# Patient Record
Sex: FEMALE | Race: WHITE | Employment: FULL TIME | URBAN - METROPOLITAN AREA
[De-identification: names, ages, dates, MRNs, and addresses within clinical notes are randomized per-mention and may not be internally consistent; named-entity substitution may affect disease eponyms.]

---

## 2018-06-01 ENCOUNTER — APPOINTMENT (OUTPATIENT)
Dept: GENERAL RADIOLOGY | Facility: CLINIC | Age: 33
End: 2018-06-01
Attending: EMERGENCY MEDICINE
Payer: COMMERCIAL

## 2018-06-01 ENCOUNTER — HOSPITAL ENCOUNTER (EMERGENCY)
Facility: CLINIC | Age: 33
Discharge: ANOTHER HOSPITAL | End: 2018-06-01
Attending: EMERGENCY MEDICINE | Admitting: EMERGENCY MEDICINE
Payer: COMMERCIAL

## 2018-06-01 VITALS
SYSTOLIC BLOOD PRESSURE: 113 MMHG | BODY MASS INDEX: 24.53 KG/M2 | HEART RATE: 99 BPM | WEIGHT: 152 LBS | DIASTOLIC BLOOD PRESSURE: 73 MMHG | TEMPERATURE: 98.1 F | RESPIRATION RATE: 16 BRPM | OXYGEN SATURATION: 100 %

## 2018-06-01 DIAGNOSIS — S61.217A LACERATION OF LEFT LITTLE FINGER WITHOUT FOREIGN BODY WITHOUT DAMAGE TO NAIL, INITIAL ENCOUNTER: ICD-10-CM

## 2018-06-01 DIAGNOSIS — S62.617B OPEN DISPLACED FRACTURE OF PROXIMAL PHALANX OF LEFT LITTLE FINGER, INITIAL ENCOUNTER: ICD-10-CM

## 2018-06-01 PROCEDURE — 25000128 H RX IP 250 OP 636: Performed by: EMERGENCY MEDICINE

## 2018-06-01 PROCEDURE — 96375 TX/PRO/DX INJ NEW DRUG ADDON: CPT | Performed by: EMERGENCY MEDICINE

## 2018-06-01 PROCEDURE — 64450 NJX AA&/STRD OTHER PN/BRANCH: CPT

## 2018-06-01 PROCEDURE — 73140 X-RAY EXAM OF FINGER(S): CPT | Mod: LT

## 2018-06-01 PROCEDURE — 96374 THER/PROPH/DIAG INJ IV PUSH: CPT | Performed by: EMERGENCY MEDICINE

## 2018-06-01 PROCEDURE — 96375 TX/PRO/DX INJ NEW DRUG ADDON: CPT

## 2018-06-01 PROCEDURE — 99285 EMERGENCY DEPT VISIT HI MDM: CPT | Performed by: EMERGENCY MEDICINE

## 2018-06-01 PROCEDURE — 96374 THER/PROPH/DIAG INJ IV PUSH: CPT

## 2018-06-01 PROCEDURE — 99285 EMERGENCY DEPT VISIT HI MDM: CPT | Mod: 25

## 2018-06-01 PROCEDURE — 99285 EMERGENCY DEPT VISIT HI MDM: CPT | Mod: Z6 | Performed by: EMERGENCY MEDICINE

## 2018-06-01 PROCEDURE — 25000132 ZZH RX MED GY IP 250 OP 250 PS 637: Performed by: EMERGENCY MEDICINE

## 2018-06-01 RX ORDER — MORPHINE SULFATE 4 MG/ML
INJECTION, SOLUTION INTRAMUSCULAR; INTRAVENOUS
Status: DISCONTINUED
Start: 2018-06-01 | End: 2018-06-01 | Stop reason: HOSPADM

## 2018-06-01 RX ORDER — LIDOCAINE HYDROCHLORIDE 10 MG/ML
5 INJECTION, SOLUTION EPIDURAL; INFILTRATION; INTRACAUDAL; PERINEURAL ONCE
Status: DISCONTINUED | OUTPATIENT
Start: 2018-06-01 | End: 2018-06-01 | Stop reason: HOSPADM

## 2018-06-01 RX ORDER — HYDROCODONE BITARTRATE AND ACETAMINOPHEN 5; 325 MG/1; MG/1
1 TABLET ORAL ONCE
Status: COMPLETED | OUTPATIENT
Start: 2018-06-01 | End: 2018-06-01

## 2018-06-01 RX ORDER — MORPHINE SULFATE 4 MG/ML
4 INJECTION, SOLUTION INTRAMUSCULAR; INTRAVENOUS ONCE
Status: COMPLETED | OUTPATIENT
Start: 2018-06-01 | End: 2018-06-01

## 2018-06-01 RX ADMIN — HYDROCODONE BITARTRATE AND ACETAMINOPHEN 1 TABLET: 5; 325 TABLET ORAL at 20:01

## 2018-06-01 RX ADMIN — MORPHINE SULFATE 4 MG: 4 INJECTION INTRAVENOUS at 22:34

## 2018-06-01 RX ADMIN — CEFAZOLIN SODIUM 1 G: 1 INJECTION, SOLUTION INTRAVENOUS at 21:43

## 2018-06-01 ASSESSMENT — ENCOUNTER SYMPTOMS
NUMBNESS: 1
WOUND: 1

## 2018-06-02 ENCOUNTER — HOSPITAL ENCOUNTER (EMERGENCY)
Facility: CLINIC | Age: 33
Discharge: HOME | End: 2018-06-02
Attending: EMERGENCY MEDICINE | Admitting: EMERGENCY MEDICINE
Payer: COMMERCIAL

## 2018-06-02 VITALS
HEIGHT: 66 IN | RESPIRATION RATE: 16 BRPM | DIASTOLIC BLOOD PRESSURE: 66 MMHG | TEMPERATURE: 98.4 F | OXYGEN SATURATION: 97 % | SYSTOLIC BLOOD PRESSURE: 103 MMHG | BODY MASS INDEX: 24.53 KG/M2 | HEART RATE: 59 BPM

## 2018-06-02 DIAGNOSIS — S62.647B OPEN NONDISPLACED FRACTURE OF PROXIMAL PHALANX OF LEFT LITTLE FINGER, INITIAL ENCOUNTER: ICD-10-CM

## 2018-06-02 PROCEDURE — 96374 THER/PROPH/DIAG INJ IV PUSH: CPT | Performed by: EMERGENCY MEDICINE

## 2018-06-02 PROCEDURE — 96375 TX/PRO/DX INJ NEW DRUG ADDON: CPT | Performed by: EMERGENCY MEDICINE

## 2018-06-02 PROCEDURE — 25000128 H RX IP 250 OP 636: Performed by: EMERGENCY MEDICINE

## 2018-06-02 RX ORDER — CEPHALEXIN 500 MG/1
500 CAPSULE ORAL 3 TIMES DAILY
Qty: 21 CAPSULE | Refills: 0 | Status: SHIPPED | OUTPATIENT
Start: 2018-06-02 | End: 2018-06-09

## 2018-06-02 RX ORDER — OXYCODONE HYDROCHLORIDE 5 MG/1
5 TABLET ORAL EVERY 6 HOURS PRN
Qty: 12 TABLET | Refills: 0 | Status: SHIPPED | OUTPATIENT
Start: 2018-06-02 | End: 2018-06-06

## 2018-06-02 RX ORDER — KETOROLAC TROMETHAMINE 30 MG/ML
30 INJECTION, SOLUTION INTRAMUSCULAR; INTRAVENOUS ONCE
Status: COMPLETED | OUTPATIENT
Start: 2018-06-02 | End: 2018-06-02

## 2018-06-02 RX ORDER — MAGNESIUM HYDROXIDE 1200 MG/15ML
LIQUID ORAL
Status: DISCONTINUED
Start: 2018-06-02 | End: 2018-06-02 | Stop reason: HOSPADM

## 2018-06-02 RX ORDER — HYDROMORPHONE HYDROCHLORIDE 1 MG/ML
0.5 INJECTION, SOLUTION INTRAMUSCULAR; INTRAVENOUS; SUBCUTANEOUS
Status: DISCONTINUED | OUTPATIENT
Start: 2018-06-02 | End: 2018-06-02 | Stop reason: HOSPADM

## 2018-06-02 RX ADMIN — KETOROLAC TROMETHAMINE 30 MG: 30 INJECTION, SOLUTION INTRAMUSCULAR at 01:21

## 2018-06-02 RX ADMIN — HYDROMORPHONE HYDROCHLORIDE 0.5 MG: 1 INJECTION, SOLUTION INTRAMUSCULAR; INTRAVENOUS; SUBCUTANEOUS at 00:50

## 2018-06-02 ASSESSMENT — ENCOUNTER SYMPTOMS
WOUND: 1
NUMBNESS: 1

## 2018-06-02 NOTE — ED TRIAGE NOTES
32-year-old female presents to the ER with complaints of a crush injury to her left hand. States she got it crushed between asphalt and a tailgate of a truck.

## 2018-06-02 NOTE — ED NOTES
Transfer from [facility]. Lac and fracture to left fifth finger. Finger wrapped and bleeding controlled on arrival to ED.

## 2018-06-02 NOTE — ED PROVIDER NOTES
History     Chief Complaint:  Hand Injury    HPI   Jennifer Roman is a 32 year old female who presents with a hand injury. After reviewing the patient's medical history, it was determined that last tetanus was given in 2014. The patient states that she was outside moving chunks of asphalt into the bed of her truck when she crushed her left hand between the hitch of her car and a chunk she was moving. The patient states that she was wearing gardening gloves during the incident, and when she took them off she was worried that the bone may be sticking out. She states that currently, her finger feels numb and this is worsening. There is no other pain in any of the other fingers. The patient had her last full meal at 6:30 pm about 1 hour before arrival to the ED. The patient is taking vitamins, but is not taking any other form of medication.    Allergies:  No Known Allergies     Medications:    Epinephrine    Past Medical History:    Abnormal Pap smear  Asthma  Rh incompatibility  Hyperemesis    Past Surgical History:    Dental Surgery     Family History:    Paternal grandfather: cancer   Paternal grandmother: cancer  Maternal grandmother: cancer    Social History:  Marital status:   Smoking status: former smoker  Quit date: 1/1/2011  smokeless tobacco: never used  Alcohol use: no   Marital Status:       Review of Systems   Skin: Positive for wound (left pinky).   Neurological: Positive for numbness (numbness of pinky finger ).   All other systems reviewed and are negative.    Physical Exam     Patient Vitals for the past 24 hrs:   BP Temp Temp src Pulse Heart Rate Resp SpO2 Weight   06/01/18 2215 - - - - - - 100 % -   06/01/18 2200 - - - - - - 99 % -   06/01/18 2146 113/73 98.1  F (36.7  C) Oral - 75 16 98 % -   06/01/18 2145 - - - - - - 98 % -   06/01/18 2140 113/73 - - - - - - -   06/01/18 1938 127/80 97.7  F (36.5  C) Temporal 99 - 18 100 % 68.9 kg (152 lb)     Physical Exam  Constitutional:  Alert, attentive, GCS 15, middle aged woman   HENT: normocephalic, atraumatic   Eyes: Normal conjunctiva. Pupils are equal, round, and reactive to light.   CV: regular rate and rhythm  Chest: Effort normal and breath sounds normal.   GI:  There is no tenderness. No distension.   MSK: Normal range of motion.   Neurological: Alert, attentive, oriented x4, sensation intact in left fifth finger except decreased over ulnar lateral aspect, able to fully flex and extend at DIP, ROM at PIP limited by pain and swelling  Skin:  laceration over radial lateral aspect of left 5th finger measuring 2.6 cm between MCP and PIP, soft tissue swelling of proximal finger up to PIP, no bone visualized, fat globule sticking out of wound     Emergency Department Course     Imaging:  Radiology findings were communicated with the patient who voiced understanding of the findings.    Xray of fingers  Fractured proximal phalanx fifth finger.  Lang Ny MD  Reading per radiology    Procedures:    PROCEDURE:  Digital Block  LOCATION:  Left Pinky Finger  ANESTHESIA: Digital block using 1% Lidocaine without Epi, total of 2 mLs  PROCEDURE NOTE: The patient tolerated the procedure well with good relief of discomfort and there were no complications. Wound was irrigated with NS by technician.  Interventions:  2001 Norco 1 tablet Oral  2143 Ancef 1 g IV  2234 Morphine 4 mg IV    Emergency Department Course:    1934 Nursing notes and vitals reviewed.    1942 I performed an exam of the patient as documented above.     2009 The patient was sent for a finger xray while in the emergency department, results above.     2057 I spoke with Dr. Mccormick of the orthopedic service from [facility] regarding patient's presentation, findings, and plan of care.    2103 I spoke with Dr. Gutierrez of the emergency department service from the [facility] regarding patient's presentation, findings, and plan of care.    2110 I spoke with Dr. Mercado of the  plastic surgery hand coverage service from the [facility] regarding patient's presentation, findings, and plan of care.     I spoke with Dr. Gutierrez of the emergency department service from the [facility] regarding patient's presentation, findings, and plan of care.      I personally reviewed the imaging results with the patient and answered all related questions prior to transfer.    Impression & Plan      Medical Decision Makin-year-old right-handed female presenting with crush injury to her left fifth finger.  Differential diagnosis includes closed fracture, open fracture, finger dislocation, laceration, tendon injury, compartment syndrome left finger.  X-ray shows a fracture involving her proximal phalanx with angulation.  She is reporting decreased sensation along the ulnar aspect of that finger.  In addition there is a 2.6 cm laceration with soft tissue swelling and continued venous oozing.  Although I do not see any discrete distinct bone protruding from the laceration, I am very concerned that this is a open fracture given the location of the laceration.  I discussed the patient with the on-call [facility] orthopedic doctor, who recommended that the patient be transferred to [facility] for hand consultation if this is truly an open fracture.  Dr. Mercado the hand surgeon from plastic surgery at [facility] is aware of the patient.  He thinks that it will also need to be reduced.  Patient was accepted for transfer by Dr. Huntley at [facility] to the emergency department.  She will be transferred by private vehicle.  IV was placed and she was given a dose of Ancef.  Her tetanus is up-to-date.  She understands plan for washout and reduction.  All her questions were answered and she was transferred to [facility] ED via private care with her .        Diagnosis:    ICD-10-CM    1. Laceration of left little finger without  foreign body without damage to nail, initial encounter S61.217A    2. Open displaced fracture of proximal phalanx of left little finger, initial encounter S62.617B      Disposition:   Patient is transferred via private vehicle to the [facility] pending further evaluation and treatment.     Scribe Disclosure:  I, Jade Rivero, am serving as a scribe at 8:45 PM on 6/1/2018 to document services personally performed by Muriel Nicole MD based on my observations and the provider's statements to me.    [facility] EMERGENCY DEPARTMENT       Muriel Nicole MD  06/02/18 0204

## 2018-06-02 NOTE — ED PROVIDER NOTES
History     Chief Complaint   Patient presents with     Hand Injury     HPI  Jennifer Roman is an otherwise healthy right-handed 32-year-old female who presents to the Emergency Department from [facility] ED for evaluation of a left hand laceration. Patient reports that she was loading wood onto a truck when she hit her left fifth digit causing a deep laceration. This was at approximately 7:00 PM tonight. Patient states that before she was given pain medication at [facility], it felt like a heavy, burning pain that has now turned into numbness. Per chart review, her last tetanus immunization was in 4/2012. Patient denies tingling sensation in fifth digit. She states that her current pain is a 5-6/10. Per [facility] note, patient was given Norco 1 tablet, Ancef 1 g IV, morphine 4 mg IV.     No current facility-administered medications for this encounter.      Current Outpatient Prescriptions   Medication     cephALEXin (KEFLEX) 500 MG capsule     oxyCODONE IR (ROXICODONE) 5 MG tablet     acetaminophen (TYLENOL) 325 MG tablet     EPINEPHrine 0.3 MG/0.3ML injection 2-pack     ibuprofen (ADVIL,MOTRIN) 400-800 mg tablet     penicillin V potassium (VEETID) 500 MG tablet     PRENATAL VITAMINS PO     Past Medical History:   Diagnosis Date     Abnormal Pap smear     normal now     Asthma     excercise induced     Rh incompatibility     A neg       Past Surgical History:   Procedure Laterality Date     DENTAL SURGERY         Family History   Problem Relation Age of Onset     CANCER Paternal Grandfather      CANCER Paternal Grandmother      CANCER Maternal Grandfather        Social History   Substance Use Topics     Smoking status: Former Smoker     Quit date: 1/1/2011     Smokeless tobacco: Never Used     Alcohol use No     No Known Allergies    I have reviewed the Medications, Allergies, Past Medical and Surgical History, and Social History in the Epic system.    Review of Systems   Skin: Positive for wound  "(laceration to left fifth digit).   Neurological: Positive for numbness (left fifth digit).        Negative for tingling sensation in fifth digit   All other systems reviewed and are negative.      Physical Exam   BP: 111/62  Pulse: 59  Temp: 98.4  F (36.9  C)  Resp: 16  Height: 167.6 cm (5' 6\")  SpO2: 100 %      Physical Exam   Constitutional: She is oriented to person, place, and time. She appears well-developed. No distress.   HENT:   Head: Normocephalic and atraumatic.   Mouth/Throat: Oropharynx is clear and moist.   Eyes: Conjunctivae and EOM are normal. Pupils are equal, round, and reactive to light.   Neck: Normal range of motion. Neck supple.   Cardiovascular: Normal rate, regular rhythm and normal heart sounds.    Pulmonary/Chest: Effort normal and breath sounds normal. No respiratory distress. She has no wheezes.   Abdominal: Soft. She exhibits no distension. There is no tenderness.   Musculoskeletal:    4 cm linear laceration on the radial aspect of the left fifth digit with no active bleeding, no evidence of exposed bone, patient with significant swelling over her left fifth phalanx with tenderness to elevation and decreased range of motion secondary to the pain and swelling.   Neurological: She is alert and oriented to person, place, and time. No cranial nerve deficit.   Skin: Skin is warm and dry. No rash noted.   Psychiatric: She has a normal mood and affect. Her behavior is normal.       ED Course   12:26 AM  The patient was seen and examined by An Huntley MD in Room 23.   ED Course     Procedures             Critical Care time:  none             Labs Ordered and Resulted from Time of ED Arrival Up to the Time of Departure from the ED - No data to display     .  Results for orders placed or performed during the hospital encounter of 06/01/18   Fingers XR, 2-3 views, left    Narrative    FINGER(S) TWO-THREE VIEWS LEFT  6/1/2018 8:18 PM     HISTORY: crush injury pinkie, swelling with laceration " along radial  aspect of finger, eval for fracture, open fracture;     COMPARISON: None.    FINDINGS: There is an oblique fracture of the midshaft of the proximal  phalanx of the fifth finger. There is mild dorsal angulation. Soft  tissue swelling..      Impression    IMPRESSION: Fractured proximal phalanx fifth finger.    BHUMI LAGUERRE MD         Assessments & Plan (with Medical Decision Making)   Jennifer Roman is an otherwise healthy right-handed 32-year-old female who presents to the Emergency Department from [facility] ED for evaluation of a left hand laceration and finger fracture s/p crush injury while moving.  Upon arrival patient is well-appearing, afebrile, moderate distress secondary to the pain.  On examination patient with 4 cm linear laceration on the radial aspect of the left fifth digit with no active bleeding, no evidence of exposed bone, patient with significant swelling over her left fifth phalanx with tenderness to elevation and decreased range of motion secondary to the pain and swelling.  Reviewed x-rays from outside hospital which are remarkable for an oblique fracture of the midshaft of the proximal phalanx of the fifth finger.  I discussed the case with orthopedics at this time no emergent need for a washout, patient was irrigated outside hospital, tetanus up-to-date, Ancef given.  Will irrigate again, placed in a temporary splint, and have patient discharged home with follow-up with orthopedic in clinic on Monday.  Unable to close the laceration due to the significant amount of swelling.  Wound was irrigated, splinted, and gauze dressing placed.  Will place patient on Keflex for prophylaxis, discharge home with pain medication, Tylenol, ibuprofen, oxycodone, rice therapy,and follow up with orthopedics in clinic Monday.  Return precautions discussed.  Patient understands and agrees with the plan. .The patient is discharged home with instructions to return if their symptoms persist  or worsen.  Plan for close follow-up with their primary physician.  I discussed workup, results, treatment, and plan with the patient.  Patient understands and agrees with the plan.      I have reviewed the nursing notes.    I have reviewed the findings, diagnosis, plan and need for follow up with the patient.    Discharge Medication List as of 6/2/2018  1:40 AM      START taking these medications    Details   cephALEXin (KEFLEX) 500 MG capsule Take 1 capsule (500 mg) by mouth 3 times daily for 7 days, Disp-21 capsule, R-0, Local Print      oxyCODONE IR (ROXICODONE) 5 MG tablet Take 1 tablet (5 mg) by mouth every 6 hours as needed for severe pain, Disp-12 tablet, R-0, Local Print             Final diagnoses:   Open nondisplaced fracture of proximal phalanx of left little finger, initial encounter   I, Alvina Torres, am serving as a trained medical scribe to document services personally performed by An Huntley MD, based on the provider's statements to me.   I, An Huntley MD, was physically present and have reviewed and verified the accuracy of this note documented by Alvina Torres.    6/1/2018   [facility], [place], EMERGENCY DEPARTMENT     An Huntley MD  06/02/18 0733       An Huntley MD  06/18/18 0027       An Huntley MD  06/18/18 0033

## 2018-06-02 NOTE — ED AVS SNAPSHOT
[facility], [place], Emergency Department    [address] [place] [id number]    Phone:  [phone]                                       Jennifer Roman   MRN: [id number]    Department:  [facility], Emergency Department   Date of Visit:  6/1/2018           After Visit Summary Signature Page     I have received my discharge instructions, and my questions have been answered. I have discussed any challenges I see with this plan with the nurse or doctor.    ..........................................................................................................................................  Patient/Patient Representative Signature      ..........................................................................................................................................  Patient Representative Print Name and Relationship to Patient    ..................................................               ................................................  Date                                            Time    ..........................................................................................................................................  Reviewed by Signature/Title    ...................................................              ..............................................  Date                                                            Time

## 2018-06-02 NOTE — ED AVS SNAPSHOT
[facility], Emergency Department    [address] [address]    Phone:  [phone]                                       Jennifer Roman   MRN: [id number]    Department:  [facility], Emergency Department   Date of Visit:  6/1/2018           Patient Information     Date Of Birth          1985        Your diagnoses for this visit were:     Open nondisplaced fracture of proximal phalanx of left little finger, initial encounter        You were seen by An Huntley MD.        Discharge Instructions       Thank You for your pateince today.  Please follow-up with the orthopedic clinic on Monday, June 4 for further evaluation and follow-up.  Please call them Monday morning to schedule a time.  Please keep finger elevated at rest to help with the swelling and please keep finger splinted and do not use your left hand until follow-up.. Please take Tylenol 1000 mg and ibuprofen 600 mg every 6 hours as needed for pain and anti-inflammation.  Please alternate these so you take something every 3 hours. Please take oxycodone 1-2 tablets every 4-6 hours as needed for severe pain.  Please take antibiotics as directed 3 times a day to help prevent infection.  Please keep her hand clean and dry.  Please return to the emergency department if you develop any worsening symptoms, high fever, redness, increasing pain.  It was a pleasure taking care of you today.  We hope you feel better soon.    Finger or Toe Fractures (Broken Finger or Toe)  A hard blow can break a bone in your toe or finger. Broken bones are also known as fractures. Even minor fractures need medical care. Without treatment, they may heal crooked, remain stiff, or develop other problems.    When to go to the Emergency Room (ER)  You may not always know when you have a fractured toe or finger. Apply ice to the injury right away. Then, seek medical care if:    Your finger or toe is swollen or very painful.    You cannot move  your finger or toe normally.    Your injured toe or finger is pale or blue.    You are bleeding.    A bone protrudes through your skin.  What to expect in the ER  A healthcare provider will ask about your injury and examine your toe or finger. You may have X-rays. Treatment will depend on the type of fracture you have.  Toe fracture  Your healthcare provider may straighten a broken toe. You'll be given local anesthesia so you won't feel any discomfort during this procedure. Your injured toe may then be splinted by being taped to a toe next to it, or placed on a pad that's taped to your foot. Your healthcare provider may also ask you to apply ice and keep your foot elevated.  Finger fracture  Your healthcare provider may straighten a broken finger. A broken finger is likely to be placed in a metal splint. This helps straighten and protect the finger while it heals. Your healthcare provider may give you exercises to do as your injury heals, to prevent stiffness in your finger.  Date Last Reviewed: 9/28/2015 2000-2017 [employer]. [address] [place] [address]. All rights reserved. This information is not intended as a substitute for professional medical care. Always follow your healthcare professional's instructions.          24 Hour Appointment Hotline       To make an appointment at any [facility], call [phone] ([phone]). If you don't have a family doctor or clinic, we will help you find one. [facility] clinics are conveniently located to serve the needs of you and your family.          ED Discharge Orders     Follow up with [facility]       You should receive a call from [facility] to schedule your follow up appointment. If you do not hear from them within 24 business hours, call [phone], option 3 for help in scheduling your follow up.  If you are seen in the Emergency Department over the weekend, you will receive a phone call on the next  business day.                     Review of your medicines      START taking        Dose / Directions Last dose taken    cephALEXin 500 MG capsule   Commonly known as:  KEFLEX   Dose:  500 mg   Quantity:  21 capsule        Take 1 capsule (500 mg) by mouth 3 times daily for 7 days   Refills:  0        oxyCODONE IR 5 MG tablet   Commonly known as:  ROXICODONE   Dose:  5 mg   Quantity:  12 tablet        Take 1 tablet (5 mg) by mouth every 6 hours as needed for severe pain   Refills:  0          Our records show that you are taking the medicines listed below. If these are incorrect, please call your family doctor or clinic.        Dose / Directions Last dose taken    acetaminophen 325 MG tablet   Commonly known as:  TYLENOL   Dose:  650 mg   Quantity:  250 tablet        Take 2 tablets by mouth every 4 hours as needed (fever greater than 102?F).   Refills:  0        EPINEPHrine 0.3 MG/0.3ML injection 2-pack   Commonly known as:  EPIPEN/ADRENACLICK/or ANY BX GENERIC EQUIV   Dose:  0.3 mg   Quantity:  0.6 mL        Inject 0.3 mLs (0.3 mg) into the muscle once as needed for anaphylaxis   Refills:  1        ibuprofen 400-800 mg tablet   Commonly known as:  ADVIL,MOTRIN   Dose:  400-800 mg        Take 1-2 tablets by mouth every 6 hours as needed (cramping).   Refills:  0        penicillin V potassium 500 MG tablet   Commonly known as:  VEETID   Dose:  500 mg   Quantity:  20 tablet        Take 1 tablet (500 mg) by mouth 2 times daily   Refills:  0        PRENATAL VITAMINS PO   Dose:  1 tablet        Take 1 tablet by mouth daily.   Refills:  0                Information about OPIOIDS     PRESCRIPTION OPIOIDS: WHAT YOU NEED TO KNOW   You have a prescription for an opioid (narcotic) pain medicine. Opioids can cause addiction. If you have a history of chemical dependency of any type, you are at a higher risk of becoming addicted to opioids. Only take this medicine after all other options have been tried. Take it for as short a  time and as few doses as possible.     Do not:    Drive. If you drive while taking these medicines, you could be arrested for driving under the influence (DUI).    Operate heavy machinery    Do any other dangerous activities while taking these medicines.     Drink any alcohol while taking these medicines.      Take with any other medicines that contain acetaminophen. Read all labels carefully. Look for the word  acetaminophen  or  Tylenol.  Ask your pharmacist if you have questions or are unsure.    Store your pills in a secure place, locked if possible. We will not replace any lost or stolen medicine. If you don t finish your medicine, please throw away (dispose) as directed by your pharmacist. The [place] [employer]ution [employer] has more information about safe disposal: https://www.pca.[facility].us/living-green/managing-unwanted-medications    All opioids tend to cause constipation. Drink plenty of water and eat foods that have a lot of fiber, such as fruits, vegetables, prune juice, apple juice and high-fiber cereal. Take a laxative (Miralax, milk of magnesia, Colace, Senna) if you don t move your bowels at least every other day.         Prescriptions were sent or printed at these locations (2 Prescriptions)                   Other Prescriptions                Printed at Department/Unit printer (2 of 2)         cephALEXin (KEFLEX) 500 MG capsule               oxyCODONE IR (ROXICODONE) 5 MG tablet                Orders Needing Specimen Collection     None      Pending Results     No orders found from 5/31/2018 to 6/3/2018.            Pending Culture Results     No orders found from 5/31/2018 to 6/3/2018.            Pending Results Instructions     If you had any lab results that were not finalized at the time of your Discharge, you can call the ED Lab Result RN at [phone]. You will be contacted by this team for any positive Lab results or changes in treatment. The nurses are available 7 days a week from [facility]  to 6:30P.  You can leave a message 24 hours per day and they will return your call.        Thank you for choosing [facility]       Thank you for choosing [facility] for your care. Our goal is always to provide you with excellent care. Hearing back from our patients is one way we can continue to improve our services. Please take a few minutes to complete the written survey that you may receive in the mail after you visit with us. Thank you!        Care EveryWhere ID     This is your Care EveryWhere ID. This could be used by other organizations to access your [facility] medical records  [id number]        Equal Access to Services     KRISTI GAO : Robinson Godfrey, tylor maurer, ese capone, jacob isabel . So [facility] [phone].    ATENCIÓN: Si habla español, tiene a treadwell disposición servicios gratuitos de asistencia lingüística. Bon al [phone].    We comply with applicable federal civil rights laws and [place] laws. We do not discriminate on the basis of race, color, national origin, age, disability, sex, sexual orientation, or gender identity.            After Visit Summary       This is your record. Keep this with you and show to your community pharmacist(s) and doctor(s) at your next visit.

## 2018-06-02 NOTE — ED NOTES
Bed: ED23  Expected date:   Expected time:   Means of arrival:   Comments:  Jennifer Roman  1985    Coming from [facility] with left fifth finger fracture involving phalanx with angulation, needs reduction done per ortho. Received 1 gm ancef and 1 norco. VSS

## 2018-06-02 NOTE — DISCHARGE INSTRUCTIONS
Thank You for your pateince today.  Please follow-up with the orthopedic clinic on Monday, June 4 for further evaluation and follow-up.  Please call them Monday morning to schedule a time.  Please keep finger elevated at rest to help with the swelling and please keep finger splinted and do not use your left hand until follow-up.. Please take Tylenol 1000 mg and ibuprofen 600 mg every 6 hours as needed for pain and anti-inflammation.  Please alternate these so you take something every 3 hours. Please take oxycodone 1-2 tablets every 4-6 hours as needed for severe pain.  Please take antibiotics as directed 3 times a day to help prevent infection.  Please keep her hand clean and dry.  Please return to the emergency department if you develop any worsening symptoms, high fever, redness, increasing pain.  It was a pleasure taking care of you today.  We hope you feel better soon.    Finger or Toe Fractures (Broken Finger or Toe)  A hard blow can break a bone in your toe or finger. Broken bones are also known as fractures. Even minor fractures need medical care. Without treatment, they may heal crooked, remain stiff, or develop other problems.    When to go to the Emergency Room (ER)  You may not always know when you have a fractured toe or finger. Apply ice to the injury right away. Then, seek medical care if:    Your finger or toe is swollen or very painful.    You cannot move your finger or toe normally.    Your injured toe or finger is pale or blue.    You are bleeding.    A bone protrudes through your skin.  What to expect in the ER  A healthcare provider will ask about your injury and examine your toe or finger. You may have X-rays. Treatment will depend on the type of fracture you have.  Toe fracture  Your healthcare provider may straighten a broken toe. You'll be given local anesthesia so you won't feel any discomfort during this procedure. Your injured toe may then be splinted by being taped to a toe next to it, or  placed on a pad that's taped to your foot. Your healthcare provider may also ask you to apply ice and keep your foot elevated.  Finger fracture  Your healthcare provider may straighten a broken finger. A broken finger is likely to be placed in a metal splint. This helps straighten and protect the finger while it heals. Your healthcare provider may give you exercises to do as your injury heals, to prevent stiffness in your finger.  Date Last Reviewed: 9/28/2015 2000-2017 [employer]. [address] [place] [address], [place] [address]. All rights reserved. This information is not intended as a substitute for professional medical care. Always follow your healthcare professional's instructions.

## 2018-06-04 ENCOUNTER — TELEPHONE (OUTPATIENT)
Dept: ORTHOPEDICS | Facility: CLINIC | Age: 33
End: 2018-06-04

## 2018-06-04 ENCOUNTER — PRE VISIT (OUTPATIENT)
Dept: ORTHOPEDICS | Facility: CLINIC | Age: 33
End: 2018-06-04

## 2018-06-04 NOTE — TELEPHONE ENCOUNTER
Triage brought to my attention an open small finger fracture that was seen in ED on 6/1 and 6/2. Discussed with Dr. Fuentes.  ED note reviewed and Dr. Fuentes felt that patient being seen in clinic tomorrow by Dr. Malloy is an adequate plan.

## 2018-06-04 NOTE — TELEPHONE ENCOUNTER
Patient is being referred by ED for Left hand laceration and finger injury.    Patient is new to this provider.  Patient has Finger x rays  from 5/1/18 in PACS and Records in epic    Patient is coming to clinic for initial consultation.      No additional orders are needed this visit.     Patient visit type and questionnaires have been reviewed and are correct for this appointment? Yes    Lyndsay Jeff LPN

## 2018-06-05 ENCOUNTER — ANESTHESIA EVENT (OUTPATIENT)
Dept: SURGERY | Facility: AMBULATORY SURGERY CENTER | Age: 33
End: 2018-06-05

## 2018-06-05 ENCOUNTER — OFFICE VISIT (OUTPATIENT)
Dept: ORTHOPEDICS | Facility: CLINIC | Age: 33
End: 2018-06-05
Payer: COMMERCIAL

## 2018-06-05 VITALS — HEIGHT: 66 IN | WEIGHT: 152 LBS | BODY MASS INDEX: 24.43 KG/M2

## 2018-06-05 DIAGNOSIS — S62.617B OPEN DISPLACED FRACTURE OF PROXIMAL PHALANX OF LEFT LITTLE FINGER, INITIAL ENCOUNTER: Primary | ICD-10-CM

## 2018-06-05 ASSESSMENT — ENCOUNTER SYMPTOMS
ORTHOPNEA: 0
NECK MASS: 0
BACK PAIN: 0
POSTURAL DYSPNEA: 0
JOINT SWELLING: 0
COUGH DISTURBING SLEEP: 0
HYPOTENSION: 0
DECREASED APPETITE: 0
SPEECH CHANGE: 0
PARALYSIS: 0
HEMOPTYSIS: 0
SPUTUM PRODUCTION: 0
POLYPHAGIA: 0
INCREASED ENERGY: 0
COUGH: 0
SEIZURES: 0
TROUBLE SWALLOWING: 0
DECREASED CONCENTRATION: 0
MEMORY LOSS: 0
WEIGHT LOSS: 0
FEVER: 0
LEG PAIN: 0
SHORTNESS OF BREATH: 1
NUMBNESS: 0
SYNCOPE: 0
MUSCLE CRAMPS: 1
HEADACHES: 0
EXERCISE INTOLERANCE: 0
SMELL DISTURBANCE: 0
SLEEP DISTURBANCES DUE TO BREATHING: 0
ARTHRALGIAS: 0
ALTERED TEMPERATURE REGULATION: 0
LOSS OF CONSCIOUSNESS: 0
TINGLING: 1
NIGHT SWEATS: 0
PALPITATIONS: 1
DYSPNEA ON EXERTION: 0
NECK PAIN: 0
CHILLS: 0
LIGHT-HEADEDNESS: 1
INSOMNIA: 0
TASTE DISTURBANCE: 0
HOT FLASHES: 0
STIFFNESS: 0
DEPRESSION: 0
DISTURBANCES IN COORDINATION: 0
MUSCLE WEAKNESS: 0
HOARSE VOICE: 0
DECREASED LIBIDO: 1
PANIC: 0
MYALGIAS: 0
SORE THROAT: 0
WEAKNESS: 0
HALLUCINATIONS: 0
DIZZINESS: 1
SINUS PAIN: 0
WEIGHT GAIN: 0
HYPERTENSION: 0
WHEEZING: 0
FATIGUE: 1
SNORES LOUDLY: 1
NERVOUS/ANXIOUS: 1
SINUS CONGESTION: 1
TREMORS: 0
POLYDIPSIA: 0

## 2018-06-05 NOTE — MR AVS SNAPSHOT
"              After Visit Summary   6/5/2018    Jennifer Roman    MRN: [id number]           Patient Information     Date Of Birth          1985        Visit Information        Provider Department      6/5/2018 11:20 AM Olivia Malloy MD [facility]        Today's Diagnoses     Open displaced fracture of proximal phalanx of left little finger, initial encounter    -  1       Follow-ups after your visit        Your next 10 appointments already scheduled     Jun 12, 2018 12:25 PM CDT   XR FINGER LEFT G/E 2 VIEWS with [id number]   [facility] ([facility])    [address] [place] [id number] [phone]           Please bring a list of your current medicines to your exam. (Include vitamins, minerals and over-thecounter medicines.) Leave your valuables at home.  Tell your doctor if there is a chance you may be pregnant.  You do not need to do anything special for this exam.            Jun 12, 2018 12:40 PM CDT   (Arrive by 12:25 PM)   RETURN HAND with Olivia Malloy MD   [facility] ([facility])    [address] [place] [id number] [phone]              Who to contact     Please call your clinic at [phone] to:    Ask questions about your health    Make or cancel appointments    Discuss your medicines    Learn about your test results    Speak to your doctor            Additional Information About Your Visit        Care EveryWhere ID     This is your Care EveryWhere ID. This could be used by other organizations to access your [place] medical records  [id number]        Your Vitals Were     Height Last Period BMI (Body Mass Index)             1.676 m (5' 6\") 06/01/2018 24.53 kg/m2          Blood Pressure from Last 3 Encounters:   06/06/18 102/65   06/02/18 103/66   06/01/18 113/73    Weight from Last 3 Encounters:   06/06/18 68.9 kg (152 " lb)   06/05/18 68.9 kg (152 lb)   06/01/18 68.9 kg (152 lb)              We Performed the Following     Chloé-Operative Worksheet        Primary Care Provider Office Phone # Fax #    Leatha Aguilar [phone]       [facility] [address] [facility] [address]        Equal Access to Services     [facility]: Hadii aad ku hadasho Soomaali, waaxda luqadaha, qaybta kaalmada adeegyada, waxay idiin hayaan adeeg chasidyrobert lathang . So [place] [phone].    ATENCIÓN: Si habla español, tiene a treadwell disposición servicios gratuitos de asistencia lingüística. Bharathame al [phone].    We comply with applicable federal civil rights laws and [place] laws. We do not discriminate on the basis of race, color, national origin, age, disability, sex, sexual orientation, or gender identity.            Thank you!     Thank you for choosing [facility]  for your care. Our goal is always to provide you with excellent care. Hearing back from our patients is one way we can continue to improve our services. Please take a few minutes to complete the written survey that you may receive in the mail after your visit with us. Thank you!             Your Updated Medication List - Protect others around you: Learn how to safely use, store and throw away your medicines at www.disposemymeds.org.          This list is accurate as of 6/5/18 11:59 PM.  Always use your most recent med list.                   Brand Name Dispense Instructions for use Diagnosis    acetaminophen 325 MG tablet    TYLENOL    250 tablet    Take 2 tablets by mouth every 4 hours as needed (fever greater than 102?F).    Abdominal pain, other specified site       cephALEXin 500 MG capsule    KEFLEX    21 capsule    Take 1 capsule (500 mg) by mouth 3 times daily for 7 days        EPINEPHrine 0.3 MG/0.3ML injection 2-pack    EPIPEN/ADRENACLICK/or ANY BX GENERIC EQUIV    0.6 mL    Inject 0.3 mLs (0.3 mg) into the muscle once as needed for  anaphylaxis        ibuprofen 400-800 mg tablet    ADVIL,MOTRIN     Take 1-2 tablets by mouth every 6 hours as needed (cramping).    Abdominal pain, other specified site       oxyCODONE IR 5 MG tablet    ROXICODONE    12 tablet    Take 1 tablet (5 mg) by mouth every 6 hours as needed for severe pain

## 2018-06-05 NOTE — NURSING NOTE
"Reason For Visit:   Chief Complaint   Patient presents with     Consult     Open nondisplaced fracture of proximal phalyanx and laceration of little finger.        Primary MD: Leatha Aguilar      Age: 32 year old    ? no    Ht 1.676 m (5' 6\")  Wt 68.9 kg (152 lb)  BMI 24.53 kg/m2      Pain Assessment  Patient Currently in Pain: Yes  0-10 Pain Scale: 2  Primary Pain Location: Hand  Pain Orientation: Left  Pain Descriptors: Aching, Tingling  Alleviating Factors: Rest  Aggravating Factors: Movement    Hand Dominance Evaluation  Hand Dominance: Right          QuickDASH Assessment  QuickDASH Main 6/5/2018   1.Open a tight or new jar. Severe difficulty   2. Do heavy household chores (e.g., wash walls, floors) Moderate difficulty   3. Carry a shopping bag or briefcase. Moderate difficulty   4. Wash your back. No difficulty   5. Use a knife to cut food. Mild difficulty   6. Recreational activities in which you take some force or impact through your arm, shoulder or hand (e.g., golf, hammering, tennis, etc.). Unable   7. During the past week, to what extent has your arm, shoulder or hand problem interfered with your normal social activities with family, friends, neighbours or groups? Moderately   8. During the past week, were you limited in your work or other regular daily activities as a result of your arm, shoulder or hand problem? Very limited   9. Arm, shoulder or hand pain. Mild   10.Tingling (pins and needles) in your arm,shoulder or hand. Moderate   11. During the past week, how much difficulty have you had sleeping because of the pain in your arm, shoulder or hand? ([place] number) No difficulty   Quickdash Ability Score 45.45          Current Outpatient Prescriptions   Medication Sig Dispense Refill     acetaminophen (TYLENOL) 325 MG tablet Take 2 tablets by mouth every 4 hours as needed (fever greater than 102 F). 250 tablet      cephALEXin (KEFLEX) 500 MG capsule Take 1 capsule (500 mg) by mouth 3 times " daily for 7 days 21 capsule 0     EPINEPHrine 0.3 MG/0.3ML injection 2-pack Inject 0.3 mLs (0.3 mg) into the muscle once as needed for anaphylaxis 0.6 mL 1     ibuprofen (ADVIL,MOTRIN) 400-800 mg tablet Take 1-2 tablets by mouth every 6 hours as needed (cramping).       oxyCODONE IR (ROXICODONE) 5 MG tablet Take 1 tablet (5 mg) by mouth every 6 hours as needed for severe pain 12 tablet 0     penicillin V potassium (VEETID) 500 MG tablet Take 1 tablet (500 mg) by mouth 2 times daily 20 tablet 0     PRENATAL VITAMINS PO Take 1 tablet by mouth daily.         No Known Allergies    Lyndsay Jeff LPN

## 2018-06-05 NOTE — NURSING NOTE
Teaching Flowsheet   Relevant Diagnosis: Left small finger proximal phalanx fracture  Teaching Topic: Pre-op for left small finger proximal phalanx ORPP, Irrigation and debridement- scheduled at [facility] 06/06/18     Person(s) involved in teaching:   Patient     Motivation Level:  Asks Questions: Yes  Eager to Learn: Yes  Cooperative: Yes  Receptive (willing/able to accept information): Yes  Any cultural factors/Scient[employer] beliefs that may influence understanding or compliance? No     Patient demonstrates understanding of the following:  Reason for the appointment, diagnosis and treatment plan: Yes  Knowledge of proper use of medications and conditions for which they are ordered (with special attention to potential side effects or drug interactions): Yes  Which situations necessitate calling provider and whom to contact: Yes     Teaching Concerns Addressed:   H&P done in the ER  Step-daughter will provide transportation  Aware of post-op expectations     Proper use and care of post-op dressing (medical equip, care aids, etc.): Yes  Nutritional needs and diet plan: Yes  Pain management techniques: Yes  Wound Care: Yes  How and/when to access community resources: Yes     Instructional Materials Used/Given: Pre-op packet, surgical soap     Time spent with patient: 12.

## 2018-06-05 NOTE — PROGRESS NOTES
Orthopedic Surgery Consultation    REFERRING PHYSICIAN: Referred Self   PRIMARY CARE PHYSICIAN: Leatha Aguilar           Chief Complaint:   Consult (Open nondisplaced fracture of proximal phalyanx and laceration of little finger. 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 On 6/1/2018, she was tossing a bag of asphalt into the back of a truck when her left small finger was struck between the asphalt and the truck tailgate. She initially went to [facility] Emergency Department and was transferred to our [facility] emergency department. She received Ancef, had irrigation of her wound, was placed in a clean dressing, and referred on for follow-up with us in clinic today. She has been taking Keflex as well since the time of injury. Her pain has been well-controlled. She denies any numbness or tingling involving the left small finger.         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 Nonsmoker. Lives at home with her  and 3 children.  Social History   Substance Use Topics     Smoking status: Former Smoker     Quit date: 1/1/2011     Smokeless tobacco: Never Used     Alcohol use No            Family History:     Family History   Problem Relation Age of Onset     CANCER Paternal Grandfather      CANCER Paternal Grandmother      CANCER Maternal Grandfather              "Allergies:   No Known Allergies         Medications:     Current Outpatient Prescriptions   Medication     acetaminophen (TYLENOL) 325 MG tablet     cephALEXin (KEFLEX) 500 MG capsule     EPINEPHrine 0.3 MG/0.3ML injection 2-pack     ibuprofen (ADVIL,MOTRIN) 400-800 mg tablet     oxyCODONE IR (ROXICODONE) 5 MG tablet     penicillin V potassium (VEETID) 500 MG tablet     PRENATAL VITAMINS PO     No current facility-administered medications for this visit.              Review of Systems:     A 10 point ROS was performed and reviewed. Specific responses to these questions are noted at the end of the document.         Physical Exam:   Vitals: Ht 1.676 m (5' 6\")  Wt 68.9 kg (152 lb)  BMI 24.53 kg/m2  Constitutional: awake, alert, cooperative, no apparent distress, appears stated age.    Eyes: The sclera are white.  Ears, Nose, Throat: The trachea is midline.  Psychiatric: The patient has a normal affect.  Respiratory: breathing non-labored  Cardiovascular: The extremities are warm and perfused.  Skin: no obvious rashes or lesions.  Musculoskeletal, Neurologic, and Spine:   Left small finger demonstrates a 2 cm longitudinal laceration with exposed muscle belly over the radial aspect of the proximal small finger. There are no sutures in place. She has associated swelling and ecchymosis surrounding the small finger. She has sensation intact to light touch on the ulnar and radial aspects. The finger is warm and well perfused.         Imaging:   We ordered and independently reviewed new radiographs at this clinic visit. The results were discussed with the patient.  Findings include:    X-ray of the left small finger demonstrates an oblique fracture of the proximal phalanx that is well aligned on the AP view, but on the lateral view there is approximately 20  of extension of the fracture site.             Assessment and Plan:   Assessment:  32 year old female with a left open small finger proximal phalanx fracture with 20  " of angulation.     Plan:  1. We discussed our recommendation for the following surgery: Closed versus open reduction with percutaneous pinning with irrigation and debridement of the open wound. This can be done under MAC and a digital block, will have it scheduled for tomorrow. We discussed the risks, alternatives, benefits of surgery, the patient was in agreement with moving forward.  Risks discussed include bleeding, infection, nerve or vessel damage, wound healing problems, bone infection, pin tract infection, malunion or nonunion, the possibility that she could require further surgery.  Anesthetic risks are rare but include breathing problems, heart problems, stroke, and death.  She was placed in a provisional splint, and she'll continue her Keflex until tomorrow.    Lillie Ortiz MD  Orthopaedic Surgery Resident  Patient was seen an examined with Dr. Malloy, who agrees with the above assessment and plan.    I have independently seen and evaluated the patient and agree with the findings and plan of care as documented by the resident and edited by me.    Answers for HPI/ROS submitted by the patient on 6/5/2018   General Symptoms: Yes  Skin Symptoms: No  HENT Symptoms: Yes  EYE SYMPTOMS: No  HEART SYMPTOMS: Yes  LUNG SYMPTOMS: Yes  INTESTINAL SYMPTOMS: No  URINARY SYMPTOMS: No  GYNECOLOGIC SYMPTOMS: Yes  BREAST SYMPTOMS: No  SKELETAL SYMPTOMS: Yes  BLOOD SYMPTOMS: No  NERVOUS SYSTEM SYMPTOMS: Yes  MENTAL HEALTH SYMPTOMS: Yes  Fever: No  Loss of appetite: No  Weight loss: No  Weight gain: No  Fatigue: Yes  Night sweats: No  Chills: No  Increased stress: No  Excessive hunger: No  Excessive thirst: No  Feeling hot or cold when others believe the temperature is normal: No  Loss of height: No  Post-operative complications: No  Surgical site pain: No  Hallucinations: No  Change in or Loss of Energy: No  Hyperactivity: No  Confusion: No  Ear pain: No  Ear discharge: No  Hearing loss: No  Tinnitus: No  Nosebleeds:  No  Congestion: Yes  Sinus pain: No  Trouble swallowing: No   Voice hoarseness: No  Mouth sores: No  Sore throat: No  Tooth pain: No  Gum tenderness: No  Bleeding gums: No  Change in taste: No  Change in sense of smell: No  Dry mouth: No  Hearing aid used: No  Neck lump: No  Cough: No  Sputum or phlegm: No  Coughing up blood: No  Difficulty breating or shortness of breath: Yes  Snoring: Yes  Wheezing: No  Difficulty breathing on exertion: No  Nighttime Cough: No  Difficulty breathing when lying flat: No  Chest pain or pressure: No  Fast or irregular heartbeat: Yes  Pain in legs with walking: No  Trouble breathing while lying down: No  Fingers or toes appear blue: No  High blood pressure: No  Low blood pressure: No  Fainting: No  Murmurs: No  Pacemaker: No  Varicose veins: No  Edema or swelling: No  Wake up at night with shortness of breath: No  Light-headedness: Yes  Exercise intolerance: No  Back pain: No  Muscle aches: No  Neck pain: No  Swollen joints: No  Joint pain: No  Bone pain: No  Muscle cramps: Yes  Muscle weakness: No  Joint stiffness: No  Bone fracture: Yes  Trouble with coordination: No  Dizziness or trouble with balance: Yes  Fainting or black-out spells: No  Memory loss: No  Headache: No  Seizures: No  Speech problems: No  Tingling: Yes  Tremor: No  Weakness: No  Difficulty walking: No  Paralysis: No  Numbness: No  Bleeding or spotting between periods: Yes  Heavy or painful periods: No  Irregular periods: Yes  Vaginal discharge: Yes  Hot flashes: No  Vaginal dryness: No  Genital ulcers: No  Reduced libido: Yes  Painful intercourse: Yes  Difficulty with sexual arousal: Yes  Post-menopausal bleeding: No  Nervous or Anxious: Yes  Depression: No  Trouble sleeping: No  Trouble thinking or concentrating: No  Mood changes: No  Panic attacks: No

## 2018-06-06 ENCOUNTER — HOSPITAL ENCOUNTER (OUTPATIENT)
Facility: AMBULATORY SURGERY CENTER | Age: 33
End: 2018-06-06
Attending: ORTHOPAEDIC SURGERY
Payer: COMMERCIAL

## 2018-06-06 ENCOUNTER — ANESTHESIA (OUTPATIENT)
Dept: SURGERY | Facility: AMBULATORY SURGERY CENTER | Age: 33
End: 2018-06-06

## 2018-06-06 ENCOUNTER — SURGERY (OUTPATIENT)
Age: 33
End: 2018-06-06

## 2018-06-06 ENCOUNTER — RADIANT APPOINTMENT (OUTPATIENT)
Dept: RADIOLOGY | Facility: AMBULATORY SURGERY CENTER | Age: 33
End: 2018-06-06
Attending: ORTHOPAEDIC SURGERY
Payer: COMMERCIAL

## 2018-06-06 VITALS
HEART RATE: 65 BPM | BODY MASS INDEX: 24.43 KG/M2 | SYSTOLIC BLOOD PRESSURE: 102 MMHG | HEIGHT: 66 IN | OXYGEN SATURATION: 100 % | DIASTOLIC BLOOD PRESSURE: 65 MMHG | WEIGHT: 152 LBS | RESPIRATION RATE: 16 BRPM | TEMPERATURE: 97.9 F

## 2018-06-06 DIAGNOSIS — S62.611B OPEN DISPLACED FRACTURE OF PROXIMAL PHALANX OF LEFT INDEX FINGER, INITIAL ENCOUNTER: Primary | ICD-10-CM

## 2018-06-06 DIAGNOSIS — S62.619A PHALANX, PROXIMAL FRACTURE OF FINGER: ICD-10-CM

## 2018-06-06 LAB
HCG UR QL: NEGATIVE
INTERNAL QC OK POCT: YES

## 2018-06-06 DEVICE — IMP WIRE KIRSCHNER 0.045X4" 3715-2-040: Type: IMPLANTABLE DEVICE | Site: FINGER | Status: FUNCTIONAL

## 2018-06-06 RX ORDER — DEXAMETHASONE SODIUM PHOSPHATE 4 MG/ML
INJECTION, SOLUTION INTRA-ARTICULAR; INTRALESIONAL; INTRAMUSCULAR; INTRAVENOUS; SOFT TISSUE PRN
Status: DISCONTINUED | OUTPATIENT
Start: 2018-06-06 | End: 2018-06-06

## 2018-06-06 RX ORDER — NALOXONE HYDROCHLORIDE 0.4 MG/ML
.1-.4 INJECTION, SOLUTION INTRAMUSCULAR; INTRAVENOUS; SUBCUTANEOUS
Status: DISCONTINUED | OUTPATIENT
Start: 2018-06-06 | End: 2018-06-07 | Stop reason: HOSPADM

## 2018-06-06 RX ORDER — ACETAMINOPHEN 325 MG/1
975 TABLET ORAL ONCE
Status: COMPLETED | OUTPATIENT
Start: 2018-06-06 | End: 2018-06-06

## 2018-06-06 RX ORDER — LIDOCAINE 40 MG/G
CREAM TOPICAL
Status: DISCONTINUED | OUTPATIENT
Start: 2018-06-06 | End: 2018-06-06 | Stop reason: HOSPADM

## 2018-06-06 RX ORDER — PROPOFOL 10 MG/ML
INJECTION, EMULSION INTRAVENOUS CONTINUOUS PRN
Status: DISCONTINUED | OUTPATIENT
Start: 2018-06-06 | End: 2018-06-06

## 2018-06-06 RX ORDER — ONDANSETRON 4 MG/1
4 TABLET, ORALLY DISINTEGRATING ORAL
Status: DISCONTINUED | OUTPATIENT
Start: 2018-06-06 | End: 2018-06-07 | Stop reason: HOSPADM

## 2018-06-06 RX ORDER — HYDROXYZINE HYDROCHLORIDE 10 MG/1
10 TABLET, FILM COATED ORAL
Status: DISCONTINUED | OUTPATIENT
Start: 2018-06-06 | End: 2018-06-07 | Stop reason: HOSPADM

## 2018-06-06 RX ORDER — CEFAZOLIN SODIUM 1 G/3ML
INJECTION, POWDER, FOR SOLUTION INTRAMUSCULAR; INTRAVENOUS PRN
Status: DISCONTINUED | OUTPATIENT
Start: 2018-06-06 | End: 2018-06-06

## 2018-06-06 RX ORDER — ONDANSETRON 2 MG/ML
4 INJECTION INTRAMUSCULAR; INTRAVENOUS EVERY 30 MIN PRN
Status: DISCONTINUED | OUTPATIENT
Start: 2018-06-06 | End: 2018-06-07 | Stop reason: HOSPADM

## 2018-06-06 RX ORDER — OXYCODONE HYDROCHLORIDE 5 MG/1
5 TABLET ORAL
Status: DISCONTINUED | OUTPATIENT
Start: 2018-06-06 | End: 2018-06-07 | Stop reason: HOSPADM

## 2018-06-06 RX ORDER — ONDANSETRON 4 MG/1
4 TABLET, ORALLY DISINTEGRATING ORAL EVERY 30 MIN PRN
Status: DISCONTINUED | OUTPATIENT
Start: 2018-06-06 | End: 2018-06-07 | Stop reason: HOSPADM

## 2018-06-06 RX ORDER — OXYCODONE HYDROCHLORIDE 5 MG/1
5-10 TABLET ORAL
Qty: 20 TABLET | Refills: 0 | Status: SHIPPED | OUTPATIENT
Start: 2018-06-06 | End: 2018-06-06

## 2018-06-06 RX ORDER — FENTANYL CITRATE 50 UG/ML
25-50 INJECTION, SOLUTION INTRAMUSCULAR; INTRAVENOUS
Status: DISCONTINUED | OUTPATIENT
Start: 2018-06-06 | End: 2018-06-06 | Stop reason: HOSPADM

## 2018-06-06 RX ORDER — SODIUM CHLORIDE, SODIUM LACTATE, POTASSIUM CHLORIDE, CALCIUM CHLORIDE 600; 310; 30; 20 MG/100ML; MG/100ML; MG/100ML; MG/100ML
INJECTION, SOLUTION INTRAVENOUS CONTINUOUS
Status: DISCONTINUED | OUTPATIENT
Start: 2018-06-06 | End: 2018-06-07 | Stop reason: HOSPADM

## 2018-06-06 RX ORDER — ONDANSETRON 2 MG/ML
INJECTION INTRAMUSCULAR; INTRAVENOUS PRN
Status: DISCONTINUED | OUTPATIENT
Start: 2018-06-06 | End: 2018-06-06

## 2018-06-06 RX ORDER — PROPOFOL 10 MG/ML
INJECTION, EMULSION INTRAVENOUS PRN
Status: DISCONTINUED | OUTPATIENT
Start: 2018-06-06 | End: 2018-06-06

## 2018-06-06 RX ORDER — NALOXONE HYDROCHLORIDE 0.4 MG/ML
.1-.4 INJECTION, SOLUTION INTRAMUSCULAR; INTRAVENOUS; SUBCUTANEOUS
Status: DISCONTINUED | OUTPATIENT
Start: 2018-06-06 | End: 2018-06-06 | Stop reason: HOSPADM

## 2018-06-06 RX ORDER — SODIUM CHLORIDE, SODIUM LACTATE, POTASSIUM CHLORIDE, CALCIUM CHLORIDE 600; 310; 30; 20 MG/100ML; MG/100ML; MG/100ML; MG/100ML
INJECTION, SOLUTION INTRAVENOUS CONTINUOUS
Status: DISCONTINUED | OUTPATIENT
Start: 2018-06-06 | End: 2018-06-06 | Stop reason: HOSPADM

## 2018-06-06 RX ORDER — GABAPENTIN 300 MG/1
300 CAPSULE ORAL ONCE
Status: COMPLETED | OUTPATIENT
Start: 2018-06-06 | End: 2018-06-06

## 2018-06-06 RX ORDER — ACETAMINOPHEN 325 MG/1
650 TABLET ORAL
Status: DISCONTINUED | OUTPATIENT
Start: 2018-06-06 | End: 2018-06-07 | Stop reason: HOSPADM

## 2018-06-06 RX ORDER — FENTANYL CITRATE 50 UG/ML
INJECTION, SOLUTION INTRAMUSCULAR; INTRAVENOUS PRN
Status: DISCONTINUED | OUTPATIENT
Start: 2018-06-06 | End: 2018-06-06

## 2018-06-06 RX ORDER — FLUMAZENIL 0.1 MG/ML
0.2 INJECTION, SOLUTION INTRAVENOUS
Status: DISCONTINUED | OUTPATIENT
Start: 2018-06-06 | End: 2018-06-06 | Stop reason: HOSPADM

## 2018-06-06 RX ORDER — ACETAMINOPHEN 325 MG/1
975 TABLET ORAL ONCE
Status: DISCONTINUED | OUTPATIENT
Start: 2018-06-06 | End: 2018-06-06 | Stop reason: HOSPADM

## 2018-06-06 RX ORDER — GABAPENTIN 300 MG/1
300 CAPSULE ORAL ONCE
Status: DISCONTINUED | OUTPATIENT
Start: 2018-06-06 | End: 2018-06-06 | Stop reason: HOSPADM

## 2018-06-06 RX ADMIN — SODIUM CHLORIDE, SODIUM LACTATE, POTASSIUM CHLORIDE, CALCIUM CHLORIDE: 600; 310; 30; 20 INJECTION, SOLUTION INTRAVENOUS at 13:11

## 2018-06-06 RX ADMIN — CEFAZOLIN SODIUM 2 G: 1 INJECTION, POWDER, FOR SOLUTION INTRAMUSCULAR; INTRAVENOUS at 13:23

## 2018-06-06 RX ADMIN — Medication 6 ML: at 14:46

## 2018-06-06 RX ADMIN — PROPOFOL 20 MG: 10 INJECTION, EMULSION INTRAVENOUS at 13:59

## 2018-06-06 RX ADMIN — DEXAMETHASONE SODIUM PHOSPHATE 4 MG: 4 INJECTION, SOLUTION INTRA-ARTICULAR; INTRALESIONAL; INTRAMUSCULAR; INTRAVENOUS; SOFT TISSUE at 13:25

## 2018-06-06 RX ADMIN — PROPOFOL 30 MG: 10 INJECTION, EMULSION INTRAVENOUS at 13:26

## 2018-06-06 RX ADMIN — FENTANYL CITRATE 25 MCG: 50 INJECTION, SOLUTION INTRAMUSCULAR; INTRAVENOUS at 14:10

## 2018-06-06 RX ADMIN — PROPOFOL 150 MCG/KG/MIN: 10 INJECTION, EMULSION INTRAVENOUS at 13:26

## 2018-06-06 RX ADMIN — FENTANYL CITRATE 50 MCG: 50 INJECTION, SOLUTION INTRAMUSCULAR; INTRAVENOUS at 13:24

## 2018-06-06 RX ADMIN — ACETAMINOPHEN 975 MG: 325 TABLET ORAL at 13:02

## 2018-06-06 RX ADMIN — GABAPENTIN 300 MG: 300 CAPSULE ORAL at 13:03

## 2018-06-06 RX ADMIN — PROPOFOL 20 MG: 10 INJECTION, EMULSION INTRAVENOUS at 14:11

## 2018-06-06 RX ADMIN — ONDANSETRON 4 MG: 2 INJECTION INTRAMUSCULAR; INTRAVENOUS at 13:24

## 2018-06-06 RX ADMIN — FENTANYL CITRATE 25 MCG: 50 INJECTION, SOLUTION INTRAMUSCULAR; INTRAVENOUS at 14:15

## 2018-06-06 NOTE — IP AVS SNAPSHOT
MRN:[id number]                      After Visit Summary   6/6/2018    Jennifer Roman    MRN: [id number]           Thank you!     Thank you for choosing [facility] for your care. Our goal is always to provide you with excellent care. Hearing back from our patients is one way we can continue to improve our services. Please take a few minutes to complete the written survey that you may receive in the mail after you visit with us. Thank you!        Patient Information     Date Of Birth          1985        About your hospital stay     You were admitted on:  June 6, 2018 You last received care in the:  [facility]    You were discharged on:  June 6, 2018       Who to Call     For medical emergencies, please call 911.  For non-urgent questions about your medical care, please call your primary care provider or clinic, [phone]  For questions related to your surgery, please call your surgery clinic        Attending Provider     Provider Olivia Billings MD Orthopedics       Primary Care Provider Office Phone # Fax #    Leatha KHUSHI Aguilar [phone]      After Care Instructions      Diet as Tolerated       Return to diet before surgery, unless instructed otherwise.            Discharge Instructions       Review outpatient procedure discharge instructions with patient as directed by Provider            No Dressing Change       No dressing change until follow up appointment.  Keep splint clean, dry, and intact.            Return to clinic       Return to clinic 6/12/18 to see Dr. Malloy                  Your next 10 appointments already scheduled     Jun 12, 2018 12:40 PM CDT   (Arrive by 12:25 PM)   RETURN HAND with Olivia Malloy MD   [facility] ([facility])    [address] [place] [id number] [phone]              Further instructions from your care team   "[facility]  Home Care Following Anesthesia  For 24 hours after surgery:  1. Get plenty of rest.  A responsible adult must stay with you for at least 24 hours after you leave the surgery center.  2. Do not drive or use heavy equipment.  If you have weakness or tingling, don't drive or use heavy equipment until this feeling goes away.   3. Do not drink alcohol.   4. Avoid strenuous or risky activities.  Ask for help when climbing stairs.  5. You may feel lightheaded.  IF so, sit for a few minutes before standing.  Have someone help you get up.   6. If you have nausea (feel sick to your stomach): Drink only clear liquids such as apple juice, ginger ale, broth or 7-Up.  Rest may also help.  Be sure to drink enough fluids.  Move to a regular diet as you feel able.   7. You may have a slight fever.  Call the doctor if your fever is over 100 F (37.7 C) (taken under the tongue) or lasts longer than 24 hours.  8. You may have a dry mouth, a sore throat, muscle aches or trouble sleeping. These should go away after 24 hours.  9. Do not make important or legal decisions.   Today you received a Marcaine or bupivacaine block to numb the nerves near your surgery site.  This is a block using local anesthetic or \"numbing\" medication injected around the nerves to anesthetize or \"numb\" the area supplied by those nerves.  This block is injected into the muscle layer near your surgical site.  The medication may numb the location where you had surgery for 6-18 hours, but may last up to 24 hours.  If your surgical site is an arm or leg you should be careful with your affected limb, since it is possible to injure your limb without being aware of it due to the numbing.  Until full feeling returns, you should guard against bumping or hitting your limb, and avoid extreme hot or cold temperatures on the skin.  As the block wears off, the feeling will return as a tingling or prickly sensation near your " surgical site.  You will experience more discomfort from your incision as the feeling returns.  You may want to take a pain pill (a narcotic or Tylenol if this was prescribed by your surgeon) when you start to experience mild pain before the pain beccomes more severe.  If your pain medications do not control your pain you should notifiy your surgeon.    Tips for taking pain medications  To get the best pain relief possible, remember these points:    Take pain medications as directed, before pain becomes severe.    Pain medication can upset your stomach: taking it with food may help.    Constipation is a common side effect of pain medication. Drink plenty of  fluids.    Eat foods high in fiber. Take a stool softener if recommended by your doctor or pharmacist.    Do not drink alcohol, drive or operate machinery while taking pain medications.    Ask about other ways to control pain, such as with heat, ice or relaxation.    Tylenol/Acetaminophen Consumption  To help encourage the safe use of acetaminophen, the makers of TYLENOL  have lowered the maximum daily dose for single-ingredient Extra Strength TYLENOL  (acetaminophen) products sold in the [place] from 8 pills per day (4,000 mg) to 6 pills per day (3,000 mg). The dosing interval has also changed from 2 pills every 4-6 hours to 2 pills every 6 hours.    If you feel your pain relief is insufficient, you may take Tylenol/Acetaminophen in addition to your narcotic pain medication.     Be careful not to exceed 3,000 mg of Tylenol/Acetaminophen in a 24 hour period from all sources.    If you are taking extra strength Tylenol/acetaminophen (500 mg), the maximum dose is 6 tablets in 24 hours.    If you are taking regular strength acetaminophen (325 mg), the maximum dose is 9 tablets in 24 hours.    Call a doctor for any of the followin. Signs of infection (fever, growing tenderness at the surgery site, a large amount of drainage or bleeding, severe pain, foul-smelling  "drainage, redness, swelling).  2. It has been over 8 to 10 hours since surgery and you are still not able to urinate (pass water).  3. Headache for over 24 hours.  Your doctor is:  Dr. Olivia Malloy, Orthopaedics: [phone]               Or dial [phone] and ask for the resident on call for:  Orthopaedics  For emergency care, call the:  [facility] Emergency Department: [phone] (TTY for hearing impaired: [phone])      Pending Results     Date and Time Order Name Status Description    6/6/2018 1310 XR SURGERY YESY FLUORO LESS THAN 5 MIN W STILLS In process             Admission Information     Date & Time Provider Department Dept. Phone    6/6/2018 Olivia Malloy MD [facility] [phone]      Your Vitals Were     Blood Pressure Pulse Temperature Respirations Height Weight    103/65 65 97.9  F (36.6  C) (Oral) 16 1.676 m (5' 5.98\") 68.9 kg (152 lb)    Last Period Pulse Oximetry BMI (Body Mass Index)             06/01/2018 100% 24.55 kg/m2         Care EveryWhere ID     This is your Care EveryWhere ID. This could be used by other organizations to access your [facility] medical records  [id number]        Equal Access to Services     KRISTI GAO AH: Hadii aad ku hadasho Soomaali, waaxda luqadaha, qaybta kaalmada adeegyada, waxay idiin hayglenda ron. So [facility] [phone].    ATENCIÓN: Si habla español, tiene a treadwell disposición servicios gratuitos de asistencia lingüística. Llame al [phone].    We comply with applicable federal civil rights laws and [place] laws. We do not discriminate on the basis of race, color, national origin, age, disability, sex, sexual orientation, or gender identity.               Review of your medicines      CONTINUE these medicines which have NOT CHANGED        Dose / Directions    acetaminophen 325 MG tablet   Commonly known as:  TYLENOL   Used for:  Abdominal pain, other specified site        Dose:  650 mg   Take 2 tablets by " mouth every 4 hours as needed (fever greater than 102?F).   Quantity:  250 tablet   Refills:  0       cephALEXin 500 MG capsule   Commonly known as:  KEFLEX        Dose:  500 mg   Take 1 capsule (500 mg) by mouth 3 times daily for 7 days   Quantity:  21 capsule   Refills:  0       EPINEPHrine 0.3 MG/0.3ML injection 2-pack   Commonly known as:  EPIPEN/ADRENACLICK/or ANY BX GENERIC EQUIV        Dose:  0.3 mg   Inject 0.3 mLs (0.3 mg) into the muscle once as needed for anaphylaxis   Quantity:  0.6 mL   Refills:  1       ibuprofen 400-800 mg tablet   Commonly known as:  ADVIL,MOTRIN   Used for:  Abdominal pain, other specified site        Dose:  400-800 mg   Take 1-2 tablets by mouth every 6 hours as needed (cramping).   Refills:  0         STOP taking     oxyCODONE IR 5 MG tablet   Commonly known as:  ROXICODONE           penicillin V potassium 500 MG tablet   Commonly known as:  VEETID           PRENATAL VITAMINS PO                    Protect others around you: Learn how to safely use, store and throw away your medicines at www.disposemymeds.org.             Medication List: This is a list of all your medications and when to take them. Check marks below indicate your daily home schedule. Keep this list as a reference.      Medications           Morning Afternoon Evening Bedtime As Needed    acetaminophen 325 MG tablet   Commonly known as:  TYLENOL   Take 2 tablets by mouth every 4 hours as needed (fever greater than 102?F).   Last time this was given:  975 mg on 6/6/2018  1:02 PM                                cephALEXin 500 MG capsule   Commonly known as:  KEFLEX   Take 1 capsule (500 mg) by mouth 3 times daily for 7 days                                EPINEPHrine 0.3 MG/0.3ML injection 2-pack   Commonly known as:  EPIPEN/ADRENACLICK/or ANY BX GENERIC EQUIV   Inject 0.3 mLs (0.3 mg) into the muscle once as needed for anaphylaxis                                ibuprofen 400-800 mg tablet   Commonly known as:   ADVIL,MOTRIN   Take 1-2 tablets by mouth every 6 hours as needed (cramping).

## 2018-06-06 NOTE — ANESTHESIA POSTPROCEDURE EVALUATION
Patient: Jennifer Roman    Procedure(s):  Open Reduction Percutaneous Pinning Left Small Finger Proximal Phalanx Irrigation and Debridement - Wound Class: I-Clean   - Wound Class: I-Clean    Diagnosis:Left Small Finger Proximal Phalanx Open Fracture  Diagnosis Additional Information: No value filed.    Anesthesia Type:  MAC    Note:  Anesthesia Post Evaluation    Patient location during evaluation: Phase 2  Patient participation: Able to fully participate in evaluation  Level of consciousness: awake and alert  Pain management: adequate  Airway patency: patent  Cardiovascular status: acceptable  Respiratory status: acceptable  Hydration status: acceptable  PONV: none     Anesthetic complications: None          Last vitals:  Vitals:    06/06/18 1241 06/06/18 1502 06/06/18 1515   BP: 97/68 103/65 102/65   Pulse: 65     Resp: 16 16 16   Temp: 36.6  C (97.8  F) 36.6  C (97.9  F)    SpO2: 98% 100% 100%         Electronically Signed By: Tom Ruelas MD  June 6, 2018  3:51 PM

## 2018-06-06 NOTE — IP AVS SNAPSHOT
[facility]    [address] [place] [address]    Phone:  [phone]    Fax:  [phone]                                       After Visit Summary   6/6/2018    Jennifer Roman    MRN: [id number]           After Visit Summary Signature Page     I have received my discharge instructions, and my questions have been answered. I have discussed any challenges I see with this plan with the nurse or doctor.    ..........................................................................................................................................  Patient/Patient Representative Signature      ..........................................................................................................................................  Patient Representative Print Name and Relationship to Patient    ..................................................               ................................................  Date                                            Time    ..........................................................................................................................................  Reviewed by Signature/Title    ...................................................              ..............................................  Date                                                            Time

## 2018-06-06 NOTE — OP NOTE
"Operative report    Date of surgery: 6/6/2018    Preoperative diagnosis: Left small finger open proximal phalanx fracture    Postoperative diagnosis: Left small finger open proximal phalanx fracture    Procedure:  1. Irrigation and debridement left small finger open fracture, including skin, subcutaneous tissue; with the use of scalpel and tenotomy  2. Closed reduction percutaneous pinning left proximal phalanx of the small finger    Anesthesia: Monitored anesthetic care plus local consisting of 6cc 0.25% marcaine plain and 1% lidocaine plain injected in a digital block fashion.    Staff surgeon: Olivia Malloy M.D.  Resident surgeon: Lillie Ortiz M.D.    EBL: 1 cc  Tourniquet time: 61 minutes  Implants: Two 0.045\" K wires    Indications: The patient is a 32-year-old female who sustained a left small finger open proximal phalanx fracture several days ago. Due to the angulation of the fracture as well as the large size of the open wound, we discussed our recommendation for surgery.  Risks discussed include bleeding, infection, nerve or vessel damage, wound healing problems, malunion or nonunion of the fracture, pin tract infection, the possibility that she could require further surgery.  Anesthetic risks are rare but include breathing problems, heart problems, stroke, and death.  The patient was in agreement with moving forward.    Procedure: The patient was met in the preoperative holding area where the surgical site was marked.  Her consent was reviewed and signed.  Her history and physical was reviewed.  She was then transferred back to the operating room and underwent MAC plus a digital block for anesthesia. 6 cc of a mixture of 0.25% Marcaine and 1% lidocaine plain were utilized for the digital block. The left upper extremity was then prepped and draped in the usual sterile fashion. A timeout was held in accordance with hospital policy.  Operative prophylactic antibiotics were administered.  The tourniquet was " inflated. The wound over the radial aspect was explored and we identified the digital radial artery and nerve were intact. We then debrided minimal skin, and some subcutaneous tissue down to bone. We then irrigated with copious saline.  These instruments were then removed and a second sterile set of instruments were used for the subsequent portion of the procedure.  We then proceeded to close reduce and percutaneous pin the proximal phalanx. This was done in an antegrade fashion with the use of two 0.045 K wires. We confirmed on fluoroscopy that the rotation, alignment, and length were appropriate. We also clinically evaluated this.  The small finger rotated slightly beneath the ring, and this was compared to her contralateral hand which demonstrated similar findings.  The tourniquet was deflated and hemostasis was achieved.  The wound was again thoroughly irrigated.  We then closed the wound with 4-0 nylon sutures. Sterile dressings were placed as well as an ulnar gutter splint. The patient was then awakened from anesthesia and transferred to the PACU in stable condition.    Postoperative plan: The patient is nonweightbearing left upper extremity. She should keep her splint clean, dry, and intact until follow-up in 2 weeks.    Dr. Malloy was available for all critical portions of the case.  Lillie GARNER I was present and scrubbed for all critical portions of the procedure and agree with operative note as dictated by the resident and edited by me.

## 2018-06-06 NOTE — DISCHARGE INSTRUCTIONS
"[facility]  Home Care Following Anesthesia  For 24 hours after surgery:  1. Get plenty of rest.  A responsible adult must stay with you for at least 24 hours after you leave the surgery center.  2. Do not drive or use heavy equipment.  If you have weakness or tingling, don't drive or use heavy equipment until this feeling goes away.   3. Do not drink alcohol.   4. Avoid strenuous or risky activities.  Ask for help when climbing stairs.  5. You may feel lightheaded.  IF so, sit for a few minutes before standing.  Have someone help you get up.   6. If you have nausea (feel sick to your stomach): Drink only clear liquids such as apple juice, ginger ale, broth or 7-Up.  Rest may also help.  Be sure to drink enough fluids.  Move to a regular diet as you feel able.   7. You may have a slight fever.  Call the doctor if your fever is over 100 F (37.7 C) (taken under the tongue) or lasts longer than 24 hours.  8. You may have a dry mouth, a sore throat, muscle aches or trouble sleeping. These should go away after 24 hours.  9. Do not make important or legal decisions.   Today you received a Marcaine or bupivacaine block to numb the nerves near your surgery site.  This is a block using local anesthetic or \"numbing\" medication injected around the nerves to anesthetize or \"numb\" the area supplied by those nerves.  This block is injected into the muscle layer near your surgical site.  The medication may numb the location where you had surgery for 6-18 hours, but may last up to 24 hours.  If your surgical site is an arm or leg you should be careful with your affected limb, since it is possible to injure your limb without being aware of it due to the numbing.  Until full feeling returns, you should guard against bumping or hitting your limb, and avoid extreme hot or cold temperatures on the skin.  As the block wears off, the feeling will return as a tingling or prickly sensation near your " surgical site.  You will experience more discomfort from your incision as the feeling returns.  You may want to take a pain pill (a narcotic or Tylenol if this was prescribed by your surgeon) when you start to experience mild pain before the pain beccomes more severe.  If your pain medications do not control your pain you should notifiy your surgeon.    Tips for taking pain medications  To get the best pain relief possible, remember these points:    Take pain medications as directed, before pain becomes severe.    Pain medication can upset your stomach: taking it with food may help.    Constipation is a common side effect of pain medication. Drink plenty of  fluids.    Eat foods high in fiber. Take a stool softener if recommended by your doctor or pharmacist.    Do not drink alcohol, drive or operate machinery while taking pain medications.    Ask about other ways to control pain, such as with heat, ice or relaxation.    Tylenol/Acetaminophen Consumption  To help encourage the safe use of acetaminophen, the makers of TYLENOL  have lowered the maximum daily dose for single-ingredient Extra Strength TYLENOL  (acetaminophen) products sold in the [place] from 8 pills per day (4,000 mg) to 6 pills per day (3,000 mg). The dosing interval has also changed from 2 pills every 4-6 hours to 2 pills every 6 hours.    If you feel your pain relief is insufficient, you may take Tylenol/Acetaminophen in addition to your narcotic pain medication.     Be careful not to exceed 3,000 mg of Tylenol/Acetaminophen in a 24 hour period from all sources.    If you are taking extra strength Tylenol/acetaminophen (500 mg), the maximum dose is 6 tablets in 24 hours.    If you are taking regular strength acetaminophen (325 mg), the maximum dose is 9 tablets in 24 hours.    Call a doctor for any of the followin. Signs of infection (fever, growing tenderness at the surgery site, a large amount of drainage or bleeding, severe pain, foul-smelling  drainage, redness, swelling).  2. It has been over 8 to 10 hours since surgery and you are still not able to urinate (pass water).  3. Headache for over 24 hours.  Your doctor is:  Dr. Olivia Malloy, Orthopaedics: [phone]               Or dial [phone] and ask for the resident on call for:  Orthopaedics  For emergency care, call the:  [facility] Emergency Department: [phone] (TTY for hearing impaired: [phone])

## 2018-06-06 NOTE — BRIEF OP NOTE
[facility]    Orthopaedic Surgery  Brief Operative Note    Pre-operative diagnosis: Left Small Finger Proximal Phalanx Open Fracture   Post-operative diagnosis Same   Procedure: Procedure(s):  Open Reduction Percutaneous Pinning Left Small Finger Proximal Phalanx Irrigation and Debridement - Wound Class: I-Clean   - Wound Class: I-Clean   Surgeon: Olivia Malloy MD   Assistants(s): Lillie Ortiz MD   Anesthesia: Monitor Anesthesia Care    Estimated blood loss: Minimal   Total IV fluids: (See anesthesia record)   Blood transfusion: (See anesthesia record)   Total urine output: (See anesthesia record)   Drains: None   Specimens: * No specimens in log *   Findings: None   Complications: None   Implants: Two 0.045 K wires   Tourniquet: 60 minutes

## 2018-06-06 NOTE — ANESTHESIA PREPROCEDURE EVALUATION
Anesthesia Evaluation     . Pt has had prior anesthetic.     History of anesthetic complications   - PONV        ROS/MED HX    ENT/Pulmonary:     (+)Intermittent asthma , . .    Neurologic:  - neg neurologic ROS     Cardiovascular:  - neg cardiovascular ROS       METS/Exercise Tolerance:  >4 METS   Hematologic:  - neg hematologic  ROS       Musculoskeletal:         GI/Hepatic:  - neg GI/hepatic ROS       Renal/Genitourinary:  - ROS Renal section negative       Endo:  - neg endo ROS       Psychiatric:         Infectious Disease:  - neg infectious disease ROS       Malignancy:      - no malignancy   Other:                     Physical Exam      Airway   Mallampati: I  TM distance: >3 FB  Neck ROM: full    Dental   (+) missing    Cardiovascular   Rhythm and rate: regular      Pulmonary    breath sounds clear to auscultation                    Anesthesia Plan      History & Physical Review  History and physical reviewed and following examination; no interval change.    ASA Status:  2 .    NPO Status:  > 8 hours    Plan for MAC Maintenance will be TIVA.  Reason for MAC:  Deep or markedly invasive procedure (G8)  PONV prophylaxis:  Ondansetron (or other 5HT-3) and Dexamethasone or Solumedrol       Postoperative Care  Postoperative pain management:  Multi-modal analgesia.      Consents  Anesthetic plan, risks, benefits and alternatives discussed with:  Patient.  Use of blood products discussed: No .   .                          .

## 2018-06-07 ENCOUNTER — PRE VISIT (OUTPATIENT)
Dept: ORTHOPEDICS | Facility: CLINIC | Age: 33
End: 2018-06-07

## 2018-06-07 DIAGNOSIS — S62.609A FINGER FRACTURE, LEFT: Primary | ICD-10-CM

## 2018-06-07 NOTE — TELEPHONE ENCOUNTER
Patient is s/p Open Reduction Percutaneous Pinning Left Small Finger Proximal Phalanx Irrigation and Debridement on 6/6/18.    Patient has not been seen postoperatively.    Patient is coming to clinic for 1 week post-op visit.      Per the operative note, plan is for wound check, x rays of left small finger ordered and scheduled .      Patient visit type and questionnaires have been reviewed and are correct for this appointment? Yes    Lyndsay Jeff LPN

## 2018-06-08 DIAGNOSIS — S62.609A FRACTURE OF PHALANX OF FINGER: Primary | ICD-10-CM

## 2018-06-08 RX ORDER — OXYCODONE HYDROCHLORIDE 5 MG/1
TABLET ORAL
Qty: 30 TABLET | Refills: 0 | Status: SHIPPED | OUTPATIENT
Start: 2018-06-08 | End: 2018-06-29

## 2018-06-08 NOTE — TELEPHONE ENCOUNTER
Patient is S/P closed reduction percutaneous pinning left proximal phalanx of the small finger 06/06/2018.  She reports she has been taking Oxycodone, 1 every 6 hours for pain with poor pain control.  Requests a refill.    Patient given a refill of Oxycodone per standing order.  She was told she can take 1-2 tablets every 6 hours as needed. She was instructed to take Acetaminophen when pain is not severe and encouraged to wean off the Oxycodone as tolerated.  She is agreeable with this.  Her  will  the prescription today.

## 2018-06-12 ENCOUNTER — TELEPHONE (OUTPATIENT)
Dept: ORTHOPEDICS | Facility: CLINIC | Age: 33
End: 2018-06-12

## 2018-06-12 ENCOUNTER — RADIANT APPOINTMENT (OUTPATIENT)
Dept: GENERAL RADIOLOGY | Facility: CLINIC | Age: 33
End: 2018-06-12
Attending: ORTHOPAEDIC SURGERY
Payer: COMMERCIAL

## 2018-06-12 ENCOUNTER — OFFICE VISIT (OUTPATIENT)
Dept: ORTHOPEDICS | Facility: CLINIC | Age: 33
End: 2018-06-12
Payer: COMMERCIAL

## 2018-06-12 VITALS — WEIGHT: 152 LBS | HEIGHT: 66 IN | BODY MASS INDEX: 24.43 KG/M2

## 2018-06-12 DIAGNOSIS — S62.617D OPEN DISPLACED FRACTURE OF PROXIMAL PHALANX OF LEFT LITTLE FINGER WITH ROUTINE HEALING, SUBSEQUENT ENCOUNTER: Primary | ICD-10-CM

## 2018-06-12 DIAGNOSIS — S62.609A FINGER FRACTURE, LEFT: ICD-10-CM

## 2018-06-12 NOTE — NURSING NOTE
"Reason For Visit:   Chief Complaint   Patient presents with     Surgical Followup     DOS 6/6/18 S/P Open Reduction Percutaneous Pinning Left Small Finger Proximal Phalanx Irrigation and Debridement       Primary MD: Leatha Aguilar      Age: 32 year old    ? No      Ht 1.676 m (5' 5.98\")  Wt 68.9 kg (152 lb)  LMP 06/01/2018  BMI 24.55 kg/m2      Pain Assessment  Patient Currently in Pain: Yes  0-10 Pain Scale: 1  Primary Pain Location: Hand  Pain Orientation: Left  Pain Descriptors: Discomfort  Aggravating Factors: Movement    Hand Dominance Evaluation  Hand Dominance: Right          QuickDASH Assessment  QuickDASH Main 6/12/2018   1.Open a tight or new jar. Unable   2. Do heavy household chores (e.g., wash walls, floors) Unable   3. Carry a shopping bag or briefcase. Severe difficulty   4. Wash your back. Unable   5. Use a knife to cut food. Unable   6. Recreational activities in which you take some force or impact through your arm, shoulder or hand (e.g., golf, hammering, tennis, etc.). Unable   7. During the past week, to what extent has your arm, shoulder or hand problem interfered with your normal social activities with family, friends, neighbours or groups? Quite a bit   8. During the past week, were you limited in your work or other regular daily activities as a result of your arm, shoulder or hand problem? Very limited   9. Arm, shoulder or hand pain. Moderate   10.Tingling (pins and needles) in your arm,shoulder or hand. None   11. During the past week, how much difficulty have you had sleeping because of the pain in your arm, shoulder or hand? ([place] number) No difficulty   Quickdash Ability Score 70.45          Current Outpatient Prescriptions   Medication Sig Dispense Refill     acetaminophen (TYLENOL) 325 MG tablet Take 2 tablets by mouth every 4 hours as needed (fever greater than 102 F). 250 tablet      EPINEPHrine 0.3 MG/0.3ML injection 2-pack Inject 0.3 mLs (0.3 mg) into the muscle " once as needed for anaphylaxis 0.6 mL 1     ibuprofen (ADVIL,MOTRIN) 400-800 mg tablet Take 1-2 tablets by mouth every 6 hours as needed (cramping).       oxyCODONE IR (ROXICODONE) 5 MG tablet Take 1-2 tablets by mouth every 6 hours as needed for pain (Patient not taking: Reported on 6/12/2018) 30 tablet 0       No Known Allergies    Lyndsay Jeff LPN

## 2018-06-12 NOTE — MR AVS SNAPSHOT
"              After Visit Summary   6/12/2018    Jennifer Roman    MRN: [id number]           Patient Information     Date Of Birth          1985        Visit Information        Provider Department      6/12/2018 12:40 PM Olivia Malloy MD [facility]        Today's Diagnoses     Open displaced fracture of proximal phalanx of left little finger with routine healing, subsequent encounter    -  1       Follow-ups after your visit        Your next 10 appointments already scheduled     Jun 29, 2018 11:50 AM CDT   (Arrive by 11:35 AM)   NEW HAND with Jerardo De Anda MD   [facility] ([facility])    [address]  4th Floor  [place] [address] [phone]              Who to contact     Please call your clinic at [phone] to:    Ask questions about your health    Make or cancel appointments    Discuss your medicines    Learn about your test results    Speak to your doctor            Additional Information About Your Visit        Care EveryWhere ID     This is your Care EveryWhere ID. This could be used by other organizations to access your [place] medical records  [id number]        Your Vitals Were     Height Last Period BMI (Body Mass Index)             1.676 m (5' 5.98\") 06/01/2018 24.55 kg/m2          Blood Pressure from Last 3 Encounters:   06/06/18 102/65   06/02/18 103/66   06/01/18 113/73    Weight from Last 3 Encounters:   06/12/18 68.9 kg (152 lb)   06/06/18 68.9 kg (152 lb)   06/05/18 68.9 kg (152 lb)              We Performed the Following     Cast application        Primary Care Provider Office Phone # Fax #    Leatha Aguilar [phone]       [facility] [address] [address] [facility] [facility] [address]        Equal Access to Services     KRISTI GAO AH: Robinson Godfrey, tylor maurer, jacob lama. So waerica " [phone].    ATENCIÓN: Si awilda lópez, tiene a treadwell disposición servicios gratuitos de asistencia lingüística. Bon day [phone].    We comply with applicable federal civil rights laws and [place] laws. We do not discriminate on the basis of race, color, national origin, age, disability, sex, sexual orientation, or gender identity.            Thank you!     Thank you for choosing [facility]  for your care. Our goal is always to provide you with excellent care. Hearing back from our patients is one way we can continue to improve our services. Please take a few minutes to complete the written survey that you may receive in the mail after your visit with us. Thank you!             Your Updated Medication List - Protect others around you: Learn how to safely use, store and throw away your medicines at www.disposemymeds.org.          This list is accurate as of 6/12/18 11:59 PM.  Always use your most recent med list.                   Brand Name Dispense Instructions for use Diagnosis    acetaminophen 325 MG tablet    TYLENOL    250 tablet    Take 2 tablets by mouth every 4 hours as needed (fever greater than 102?F).    Abdominal pain, other specified site       EPINEPHrine 0.3 MG/0.3ML injection 2-pack    EPIPEN/ADRENACLICK/or ANY BX GENERIC EQUIV    0.6 mL    Inject 0.3 mLs (0.3 mg) into the muscle once as needed for anaphylaxis        ibuprofen 400-800 mg tablet    ADVIL,MOTRIN     Take 1-2 tablets by mouth every 6 hours as needed (cramping).    Abdominal pain, other specified site       oxyCODONE IR 5 MG tablet    ROXICODONE    30 tablet    Take 1-2 tablets by mouth every 6 hours as needed for pain    Fracture of phalanx of finger

## 2018-06-12 NOTE — PROGRESS NOTES
"[facility]  Orthopedics  Olivia Malloy MD  2018     Name: Jennifer Roman  MRN: [id number]  Age: 32 year old  : 1985  Referring provider: Olivia Malloy     Chief Complaint: Status post left small finger I&D and closed reduction and pinning left proximal phalanx.    History of Present Illness:   Jennifer Roman is a 32 year old female 6 days status-post irrigation and debridement left small finger open fracture and closed reduction percutaneous pinning left proximal phalanx of the small finger completed on 2018 who presents for postoperative evaluation. She reports she had significant swelling and pain the first 2 days following surgery, but that is markedly improved. She has kept her splint clean, dry, and intact. She completed her antibiotic course.  She denies numbness or tingling.  No fever or chills.    Physical Examination:  VITALS: Ht 1.676 m (5' 5.98\")  Wt 68.9 kg (152 lb)  LMP 2018  BMI 24.55 kg/m2  General: Healthy-appearing adult female in no acute distress.  Alert and oriented times three.  Respiratory: Breathing regular and non-labored.  Left small finger: Pin sites clean, dry, and intact with no surrounding erythema or drainage, radial incision with nylon sutures in place and healing appropriately, no drainage or erythema. Mild swelling throughout the small finger. Sensation slightly decreased on both the radial and ulnar aspects of the finger.    Radiographs:   Radiographs of the left small finger, 3 views (2018): Demonstrate stable pin fixation of the proximal phalanx with appropriate alignment    I have independently reviewed the above imaging studies; the results were discussed with the patient.     Assessment:   32 year old female 6 days status-post irrigation and debridement left small finger open fracture and closed reduction percutaneous pinning left proximal phalanx of the small finger completed on 2018. Recovering " appropriately.    Plan:   1. Sutures removed, Steri-Strips placed. Patient transitioned to an ulnar gutter cast today.  2. The patient will follow-up with Dr. De Anda on June 29 for the following: Cast removal, left index finger x-ray, pin removal, and occupational therapy visit to begin index finger range of motion as tolerated.  (This was scheduled with Dr. De Anda because the patient will be in [place] for a few days, and Dr. Malloy is not in clinic once she returns)    Patient was seen and evaluated with Dr. Gama Ortiz M.D.    I have independently seen and evaluated the patient and agree with the findings and plan of care as documented by the resident and edited by me.

## 2018-06-12 NOTE — NURSING NOTE
Relevant Diagnosis: open reduction percutaneous pinning left small finger proximal phalanx I&D    short arm application and care    Type of Cast applied: intrinsic plus   Cast/Splinting material used: fiberglass    Person(s) involved in teaching:   Patient     Motivation Level:  Asks Questions: Yes  Eager to Learn: Yes  Cooperative: Yes  Receptive (willing/able to accept information): Yes     Patient demonstrates understanding of the following:   Reason for the appointment, diagnosis and treatment plan: Yes    Which situations necessitate calling provider and whom to contact: Yes     Teaching Concerns Addressed:   Comments: none     Proper use and care of intrinsic plus cast: Yes  Pain management techniques: Yes  Wound Care: Yes  How and/when to access community resources: Yes     Cast was applied in standard [place]:  Yes  Cast fit well:  Yes  Patient reports cast to fit comfortably:  Yes    Instructional Materials Used/Given: verbal instructions given, patient agreeable.

## 2018-06-12 NOTE — TELEPHONE ENCOUNTER
[facility] Call Center    Phone Message    May a detailed message be left on voicemail: yes    Reason for Call: Other: Pt is unable to come in today for her post-op appt, pt does not have childcare, pt is available tomorrow 6/13/2018, next available appt I saw was not until 6/19/2018, please call pt to schedule appt for post-op     Action Taken: Message routed to:  [facility]     06/12/18  Patient was contracted and stated she is able to come to the clinic today.

## 2018-06-26 ENCOUNTER — PRE VISIT (OUTPATIENT)
Dept: ORTHOPEDICS | Facility: CLINIC | Age: 33
End: 2018-06-26

## 2018-06-26 NOTE — TELEPHONE ENCOUNTER
FUTURE VISIT INFORMATION      FUTURE VISIT INFORMATION:    Date: 6/29    Time: 11:50    Location: [facility]  REFERRAL INFORMATION:    Referring provider:  Olivia Malloy    Referring providers clinic:  [facility] ortho    Reason for visit/diagnosis  l small finger post op    RECORDS REQUESTED FROM:       Clinic name Comments Records Status Imaging Status                                         RECORDS STATUS      Pt of Dr Malloy. Seeing Dr De Anda since Dr Malloy will be out of town.

## 2018-06-28 DIAGNOSIS — Z09 FOLLOW-UP EXAMINATION FOLLOWING SURGERY: Primary | ICD-10-CM

## 2018-06-29 ENCOUNTER — THERAPY VISIT (OUTPATIENT)
Dept: OCCUPATIONAL THERAPY | Facility: CLINIC | Age: 33
End: 2018-06-29
Payer: COMMERCIAL

## 2018-06-29 ENCOUNTER — OFFICE VISIT (OUTPATIENT)
Dept: ORTHOPEDICS | Facility: CLINIC | Age: 33
End: 2018-06-29
Payer: COMMERCIAL

## 2018-06-29 ENCOUNTER — RADIANT APPOINTMENT (OUTPATIENT)
Dept: GENERAL RADIOLOGY | Facility: CLINIC | Age: 33
End: 2018-06-29
Attending: ORTHOPAEDIC SURGERY
Payer: COMMERCIAL

## 2018-06-29 DIAGNOSIS — Z09 FOLLOW-UP EXAMINATION FOLLOWING SURGERY: Primary | ICD-10-CM

## 2018-06-29 DIAGNOSIS — Z09 FOLLOW-UP EXAMINATION FOLLOWING SURGERY: ICD-10-CM

## 2018-06-29 DIAGNOSIS — S62.619A PROXIMAL PHALANX FRACTURE OF FINGER: ICD-10-CM

## 2018-06-29 DIAGNOSIS — Z09 SURGICAL FOLLOW-UP CARE: Primary | ICD-10-CM

## 2018-06-29 DIAGNOSIS — M79.645 PAIN OF FINGER OF LEFT HAND: Primary | ICD-10-CM

## 2018-06-29 PROCEDURE — 97165 OT EVAL LOW COMPLEX 30 MIN: CPT | Mod: GO | Performed by: OCCUPATIONAL THERAPIST

## 2018-06-29 PROCEDURE — 97110 THERAPEUTIC EXERCISES: CPT | Mod: GO | Performed by: OCCUPATIONAL THERAPIST

## 2018-06-29 PROCEDURE — 97760 ORTHOTIC MGMT&TRAING 1ST ENC: CPT | Mod: GO | Performed by: OCCUPATIONAL THERAPIST

## 2018-06-29 PROCEDURE — 29086 APPLICATION CAST FINGER: CPT | Mod: GO | Performed by: OCCUPATIONAL THERAPIST

## 2018-06-29 NOTE — PROGRESS NOTES
Hand Therapy Initial Evaluation    Current Date:  6/29/2018    Diagnosis: Left small finger proximal phalanx kevin     DOI: 06/01/2018  DOS: 06/06/18  Procedure:  S/P Open Reduction Percutaneous Pinning Left Small Finger Proximal Phalanx Irrigation and Debridement  Post:  3w 2d  Referring MD: Dr. Olivia Malloy    Subjective:  Jennifer Roman is a 32 year old right hand dominant female.    Patient reports symptoms of pain, stiffness/loss of motion, weakness/loss of strength, edema, numbness and tingling  of the left small finger which occurred due to hitting finger into back of tailgate while moving asphalt. Since onset symptoms are Gradually getting better.  Special tests:  x-ray.  Previous treatment: casted and surgery.    General health as reported by patient is good.  Pertinent medical history includes:Depression, Numbness/Tingling, Smoking  Medical allergies:none.  Surgical history: orthopedic: finger fracture.  Medication history: None.    Patient states that she works in a very dirty environment. Was educated on pin care and signs of infection to watch for. Given surgical soap by Dr. De Anda's nurse    Occupational Profile Information:  Current occupation is training supervisor for [employer]  Currently working in normal job with restrictions  Job Tasks: Computer Work, Lifting, Carrying, Prolonged Standing, Pushing, Pulling, Repetitive Tasks  Prior functional level:  no limitations  Barriers include:none  Mobility: No difficulty  Transportation: drives  Leisure activities/hobbies: relaxing. Works overnight and takes care of kids during day, sleeps when can    Functional Outcome Measure:  Upper Extremity Functional Index Score:  SCORE:   Column Totals: /80: 34   (A lower score indicates greater disability.)    Objective:  Pain Level Report  VAS(0-10) 6/29/2018   At Rest: 2/10   With Use: 4/10     Report of Pain:  Location:  small finger  Pain Quality:  Aching, Numb, Throbbing and Tingling  Frequency:  "intermittent    Pain is worst:  Movement and activity   Exacerbated by:  Activity and moving around  Relieved by:  cold, NSAIDs and rest  Progression:  Gradually improving  Finger Edema  Circumference:  (Measured in cm)    6/29/2018   Location: Small finger R L   P1 4.8 6.2   PIP 4.9 6   P2 4.5 5.5   DIP 4.2 5     Sensation: Decreased Ulnar Nerve distribution - numbness and \"blood pulsing\" sensation noted in small finger    ROM:  Pain Report:  - none    + mild    ++ moderate    +++ severe   Small Finger  6/29/2018   AROM(PROM) R L   MCP HE/95 -40/50   PIP 0/95 -25/25   DIP 0/95 -18/23   ELLIOTT       Strength:   Contraindicated    Assessment:  Patient presents with symptoms consistent with diagnosis of small finger proximal phalanx fracture,  with surgical  intervention.     Patient's limitations or Problem List includes:  Pain, Decreased ROM/motion, Increased edema, Weakness and Sensory disturbance of the left small finger which interferes with the patient's ability to perform Self Care Tasks (dressing, bathing), Work Tasks, Sleep Patterns, Recreational Activities, Household Chores and Driving  as compared to previous level of function.    Rehab Potential:  Excellent - Return to full activity, no limitations    Patient will benefit from skilled Occupational Therapy to increase ROM,  strength and pinch strength and decrease pain and paresthesias to return to previous activity level and resume normal daily tasks and to reach their rehab potential.    Barriers to Learning:  No barrier    Communication Issues:  Patient appears to be able to clearly communicate and understand verbal and written communication and follow directions correctly.    Chart Review: Brief history including review of medical and/or therapy records relating to the presenting problem and Simple history review with patient    Identified Performance Deficits: bathing/showering, dressing, child rearing, driving and community mobility, home " establishment and management, meal preparation and cleanup, shopping, sleep, work and leisure activities    Assessment of Occupational Performance:  5 or more Performance Deficits    Clinical Decision Making (Complexity): Low complexity    Treatment Explanation:  The following has been discussed with the patient:  RX ordered/plan of care  Anticipated outcomes  Possible risks and side effects    Plan:  Frequency:  1 X week, once daily  Duration:  for 10 weeks    Treatment Plan:   Modalities:  US, Fluidotherapy and Paraffin  Therapeutic Exercise:  AROM, AAROM, PROM, Tendon Gliding, Blocking, Reverse Blocking, Place and Hold, Extensor Tracking, Isotonics and Isometrics  Neuromuscular re-education:  Nerve Gliding, Desensitization and Kinesiotaping  Manual Techniques:  Joint mobilization, Scar mobilization, Friction massage, Myofascial release and Manual edema mobilization  Orthotic Fabrication:  Static orthosis and Hand based orthosis  Self Care:  Self Care Tasks  Discharge Plan:  Achieve all LTG.  Independent in home treatment program.  Reach maximal therapeutic benefit.    Home Exercise Program:  Gentle wrist AROM (e/f, circumduction)  Tendon gliding  Custom splint full time  Pin care    Next Visit:  Assess wrist motion  Check splint  Check finger motion  Gentle motion of DIP/PIP by therapist

## 2018-06-29 NOTE — LETTER
6/29/2018       RE: Jennifer Roman  [address]  [place] [address]     Dear Colleague,    Thank you for referring your patient, Jennifer Roman, to the [facility] at [facility]. Please see a copy of my visit note below.    Follow-up left small finger closed reduction percutaneous pinning.  Doing well.  Minimal pain.  No fevers or chills.  She is about 3 weeks out from surgery now.  She has been splinted    The past medical history was reviewed and documented in the chart.  This includes medications, surgeries, social history as well as review of systems.    Physical exam, pin sites are clean, no signs of infection, minimal swelling.  Overall alignment looks acceptable.  Neurovascularly she is intact.  She is quite stiff though at the DIP and PIP joints.  No motor, no sensory deficits are noted.  No significant atrophy.  There is brisk capillary refill in all digits and a palpable radial pulse.  No overlying skin changes noted.    X-rays are taken today 3 views left small finger, AP, lateral, oblique.  Fracture position appears to be acceptable.  No signs of any pin migration.    Impression: Status post close reduction, percutaneous pinning left small finger proximal phalanx fracture    Plan: I think it is too early to remove the pins at this point.  Although I am going to have therapy see her, hand based ulnar gutter splint.  She can work on some gentle motion.  I will see her back next week and we will plan on removing the pins next week.    Again, thank you for allowing me to participate in the care of your patient.      Sincerely,    Jerardo De Anda MD

## 2018-06-29 NOTE — MR AVS SNAPSHOT
After Visit Summary   6/29/2018    Jennifer Roman    MRN: [id number]           Patient Information     Date Of Birth          1985        Visit Information        Provider Department      6/29/2018 11:50 AM Jerardo De Anda MD [facility]        Today's Diagnoses     Surgical follow-up care    -  1       Follow-ups after your visit        Additional Services     HAND THERAPY       Hand Therapy Referral                  Your next 10 appointments already scheduled     Jun 29, 2018  1:00 PM CDT   (Arrive by 12:45 PM)   KASSANDRA Hand with Kisha Reyes OT   [facility] Hand [facility] ([facility])    [address]  [address] [facility] [address] [phone]            Jul 06, 2018 10:40 AM CDT   (Arrive by 10:25 AM)   RETURN HAND with Jerardo De Anda MD   [facility] ([facility])    [address]  [address] [facility] [address] [phone]              Who to contact     Please call your clinic at [phone] to:    Ask questions about your health    Make or cancel appointments    Discuss your medicines    Learn about your test results    Speak to your doctor            Additional Information About Your Visit        Care EveryWhere ID     This is your Care EveryWhere ID. This could be used by other organizations to access your [facility] medical records  [id number]        Your Vitals Were     Last Period                   06/01/2018            Blood Pressure from Last 3 Encounters:   06/06/18 102/65   06/02/18 103/66   06/01/18 113/73    Weight from Last 3 Encounters:   06/12/18 68.9 kg (152 lb)   06/06/18 68.9 kg (152 lb)   06/05/18 68.9 kg (152 lb)              We Performed the Following     HAND THERAPY        Primary Care Provider Office Phone # Fax #    Leatha Aguilar [phone]       [address] [address]         Equal Access to Services     [facility]: Hadii aad ku hadpasqualeblanka Bretali, wadejada luqadaha, qagradyta kamiriamjacob greene. So [facility] [phone].    ATENCIÓN: Si habla rene, tiene a treadwell disposición servicios gratuitos de asistencia lingüística. Bharathame al [phone].    We comply with applicable federal civil rights laws and [place] laws. We do not discriminate on the basis of race, color, national origin, age, disability, sex, sexual orientation, or gender identity.            Thank you!     Thank you for choosing [facility]  for your care. Our goal is always to provide you with excellent care. Hearing back from our patients is one way we can continue to improve our services. Please take a few minutes to complete the written survey that you may receive in the mail after your visit with us. Thank you!             Your Updated Medication List - Protect others around you: Learn how to safely use, store and throw away your medicines at www.disposemymeds.org.          This list is accurate as of 6/29/18 12:23 PM.  Always use your most recent med list.                   Brand Name Dispense Instructions for use Diagnosis    acetaminophen 325 MG tablet    TYLENOL    250 tablet    Take 2 tablets by mouth every 4 hours as needed (fever greater than 102?F).    Abdominal pain, other specified site       EPINEPHrine 0.3 MG/0.3ML injection 2-pack    EPIPEN/ADRENACLICK/or ANY BX GENERIC EQUIV    0.6 mL    Inject 0.3 mLs (0.3 mg) into the muscle once as needed for anaphylaxis        ibuprofen 400-800 mg tablet    ADVIL,MOTRIN     Take 1-2 tablets by mouth every 6 hours as needed (cramping).    Abdominal pain, other specified site

## 2018-06-29 NOTE — PROGRESS NOTES
Follow-up left small finger closed reduction percutaneous pinning.  Doing well.  Minimal pain.  No fevers or chills.  She is about 3 weeks out from surgery now.  She has been splinted    The past medical history was reviewed and documented in the chart.  This includes medications, surgeries, social history as well as review of systems.    Physical exam, pin sites are clean, no signs of infection, minimal swelling.  Overall alignment looks acceptable.  Neurovascularly she is intact.  She is quite stiff though at the DIP and PIP joints.  No motor, no sensory deficits are noted.  No significant atrophy.  There is brisk capillary refill in all digits and a palpable radial pulse.  No overlying skin changes noted.    X-rays are taken today 3 views left small finger, AP, lateral, oblique.  Fracture position appears to be acceptable.  No signs of any pin migration.    Impression: Status post close reduction, percutaneous pinning left small finger proximal phalanx fracture    Plan: I think it is too early to remove the pins at this point.  Although I am going to have therapy see her, hand based ulnar gutter splint.  She can work on some gentle motion.  I will see her back next week and we will plan on removing the pins next week.

## 2018-06-29 NOTE — NURSING NOTE
Reason For Visit:   Chief Complaint   Patient presents with     Surgical Followup     DOS 6/6/18 S/P Open Reduction Percutaneous Pinning Left Small Finger Proximal Phalanx Irrigation and Debridement     Results     Cast off, xrays, pins out today.       Primary MD: Leatha Aguilar  Ref. MD: Gama patient.     Age: 32 year old    ?  No        Pain Assessment  Patient Currently in Pain: Yes  0-10 Pain Scale: 2  Primary Pain Location: Hand  Pain Orientation: Left    Hand Dominance Evaluation  Hand Dominance: Right          QuickDASH Assessment  QuickDASH Main 6/12/2018   1.Open a tight or new jar. Unable   2. Do heavy household chores (e.g., wash walls, floors) Unable   3. Carry a shopping bag or briefcase. Severe difficulty   4. Wash your back. Unable   5. Use a knife to cut food. Unable   6. Recreational activities in which you take some force or impact through your arm, shoulder or hand (e.g., golf, hammering, tennis, etc.). Unable   7. During the past week, to what extent has your arm, shoulder or hand problem interfered with your normal social activities with family, friends, neighbours or groups? Quite a bit   8. During the past week, were you limited in your work or other regular daily activities as a result of your arm, shoulder or hand problem? Very limited   9. Arm, shoulder or hand pain. Moderate   10.Tingling (pins and needles) in your arm,shoulder or hand. None   11. During the past week, how much difficulty have you had sleeping because of the pain in your arm, shoulder or hand? ([place] number) No difficulty   Quickdash Ability Score 70.45          Current Outpatient Prescriptions   Medication Sig Dispense Refill     acetaminophen (TYLENOL) 325 MG tablet Take 2 tablets by mouth every 4 hours as needed (fever greater than 102 F). 250 tablet      ibuprofen (ADVIL,MOTRIN) 400-800 mg tablet Take 1-2 tablets by mouth every 6 hours as needed (cramping).       EPINEPHrine 0.3 MG/0.3ML injection 2-pack  Inject 0.3 mLs (0.3 mg) into the muscle once as needed for anaphylaxis (Patient not taking: Reported on 6/29/2018) 0.6 mL 1       No Known Allergies    Bette Tabares LPN

## 2018-06-29 NOTE — MR AVS SNAPSHOT
After Visit Summary   6/29/2018    Jennifer Roman    MRN: [id number]           Patient Information     Date Of Birth          1985        Visit Information        Provider Department      6/29/2018 1:00 PM Kisha Reyes OT [facility] Hand Therapy        Today's Diagnoses     Pain of finger of left hand    -  1    Proximal phalanx fracture of finger           Follow-ups after your visit        Your next 10 appointments already scheduled     Jul 06, 2018  8:40 AM CDT   (Arrive by 8:25 AM)   RETURN HAND with Jerardo De Anda MD   [facility] ([facility])    [address] [place] [address] [phone]              Who to contact     If you have questions or need follow up information about today's clinic visit or your schedule please contact [facility] HAND THERAPY directly at [phone].  Normal or non-critical lab and imaging results will be communicated to you by MyChart, letter or phone within 4 business days after the clinic has received the results. If you do not hear from us within 7 days, please contact the clinic through MyChart or phone. If you have a critical or abnormal lab result, we will notify you by phone as soon as possible.  Submit refill requests through [employer] or call your pharmacy and they will forward the refill request to us. Please allow 3 business days for your refill to be completed.          Additional Information About Your Visit        Care EveryWhere ID     This is your Care EveryWhere ID. This could be used by other organizations to access your [facility] medical records  [id number]        Your Vitals Were     Last Period                   06/01/2018            Blood Pressure from Last 3 Encounters:   06/06/18 102/65   06/02/18 103/66   06/01/18 113/73    Weight from Last 3 Encounters:   06/12/18 68.9 kg (152 lb)   06/06/18 68.9 kg (152 lb)   06/05/18 68.9 kg (152 lb)              We  Performed the Following     HC OT EVAL, LOW COMPLEXITY     KASSANDRA INITIAL EVAL REPORT     ORTHOTIC MGMT AND TRAINING, EACH 15 MIN     THERAPEUTIC EXERCISES        Primary Care Provider Office Phone # Fax #    Leatha KHUSHI Aguilar [phone]       [facility] [address] [facility] [address]        Equal Access to Services     KRISTI GAO : Hadii aad ku hadasho Soomaali, waaxda luqadaha, qaybta kaalmada adeegyada, waxyana uriartein haycynthian sylvia gossdejuanrobert ron. So [facility] [phone].    ATENCIÓN: Si habla español, tiene a treadwell disposición servicios gratuitos de asistencia lingüística. [facility] [phone].    We comply with applicable federal civil rights laws and [place] laws. We do not discriminate on the basis of race, color, national origin, age, disability, sex, sexual orientation, or gender identity.            Thank you!     Thank you for choosing [facility] HAND THERAPY  for your care. Our goal is always to provide you with excellent care. Hearing back from our patients is one way we can continue to improve our services. Please take a few minutes to complete the written survey that you may receive in the mail after your visit with us. Thank you!             Your Updated Medication List - Protect others around you: Learn how to safely use, store and throw away your medicines at www.disposemymeds.org.          This list is accurate as of 6/29/18  4:24 PM.  Always use your most recent med list.                   Brand Name Dispense Instructions for use Diagnosis    acetaminophen 325 MG tablet    TYLENOL    250 tablet    Take 2 tablets by mouth every 4 hours as needed (fever greater than 102?F).    Abdominal pain, other specified site       EPINEPHrine 0.3 MG/0.3ML injection 2-pack    EPIPEN/ADRENACLICK/or ANY BX GENERIC EQUIV    0.6 mL    Inject 0.3 mLs (0.3 mg) into the muscle once as needed for anaphylaxis        ibuprofen 400-800 mg tablet    ADVIL,MOTRIN     Take 1-2 tablets by mouth every  6 hours as needed (cramping).    Abdominal pain, other specified site

## 2018-07-03 DIAGNOSIS — S62.619A PROXIMAL PHALANX FRACTURE OF FINGER: Primary | ICD-10-CM

## 2018-07-06 ENCOUNTER — RADIANT APPOINTMENT (OUTPATIENT)
Dept: GENERAL RADIOLOGY | Facility: CLINIC | Age: 33
End: 2018-07-06
Attending: ORTHOPAEDIC SURGERY
Payer: COMMERCIAL

## 2018-07-06 ENCOUNTER — OFFICE VISIT (OUTPATIENT)
Dept: ORTHOPEDICS | Facility: CLINIC | Age: 33
End: 2018-07-06
Payer: COMMERCIAL

## 2018-07-06 ENCOUNTER — THERAPY VISIT (OUTPATIENT)
Dept: OCCUPATIONAL THERAPY | Facility: CLINIC | Age: 33
End: 2018-07-06
Payer: COMMERCIAL

## 2018-07-06 DIAGNOSIS — M79.645 PAIN OF FINGER OF LEFT HAND: Primary | ICD-10-CM

## 2018-07-06 DIAGNOSIS — S62.619A PROXIMAL PHALANX FRACTURE OF FINGER: ICD-10-CM

## 2018-07-06 DIAGNOSIS — S62.646D CLOSED NONDISPLACED FRACTURE OF PROXIMAL PHALANX OF RIGHT LITTLE FINGER WITH ROUTINE HEALING, SUBSEQUENT ENCOUNTER: ICD-10-CM

## 2018-07-06 DIAGNOSIS — S62.617D CLOSED DISPLACED FRACTURE OF PROXIMAL PHALANX OF LEFT LITTLE FINGER WITH ROUTINE HEALING, SUBSEQUENT ENCOUNTER: Primary | ICD-10-CM

## 2018-07-06 DIAGNOSIS — M25.642 STIFFNESS OF FINGER JOINT, LEFT: ICD-10-CM

## 2018-07-06 PROBLEM — M25.641 STIFFNESS OF FINGER JOINT, RIGHT: Status: ACTIVE | Noted: 2018-07-06

## 2018-07-06 PROCEDURE — 97763 ORTHC/PROSTC MGMT SBSQ ENC: CPT | Mod: GO | Performed by: OCCUPATIONAL THERAPIST

## 2018-07-06 PROCEDURE — 97110 THERAPEUTIC EXERCISES: CPT | Mod: GO | Performed by: OCCUPATIONAL THERAPIST

## 2018-07-06 PROCEDURE — 97140 MANUAL THERAPY 1/> REGIONS: CPT | Mod: GO | Performed by: OCCUPATIONAL THERAPIST

## 2018-07-06 PROCEDURE — 29085 APPL CAST HAND&LWR FOREARM: CPT | Mod: GO | Performed by: OCCUPATIONAL THERAPIST

## 2018-07-06 PROCEDURE — 97112 NEUROMUSCULAR REEDUCATION: CPT | Mod: GO | Performed by: OCCUPATIONAL THERAPIST

## 2018-07-06 NOTE — MR AVS SNAPSHOT
After Visit Summary   7/6/2018    Jennifer Roman    MRN: [id number]           Patient Information     Date Of Birth          1985        Visit Information        Provider Department      7/6/2018 10:00 AM Gina Mayorga OT [facility] Hand Therapy        Today's Diagnoses     Pain of finger of left hand    -  1    Closed nondisplaced fracture of proximal phalanx of right little finger with routine healing, subsequent encounter        Stiffness of finger joint, left           Follow-ups after your visit        Your next 10 appointments already scheduled     Jul 12, 2018  8:30 AM CDT   [facility]ANDRA Hand with Kisha Reyes, OT   [facility]    [address]  [address]  [facility] [address]   [phone]            Jul 17, 2018  8:30 AM CDT   KASSANDRA Hand with Kenia Lau   [facility]    [address]  [address]  [facility] [address]   [phone]            Jul 19, 2018  8:30 AM CDT   KASSANDRA Hand with Kenia Lau   [facility] ([facility]  )    [address]  [address] [place] [address]   [phone]              Who to contact     If you have questions or need follow up information about today's clinic visit or your schedule please contact [facility] HAND THERAPY directly at [phone].  Normal or non-critical lab and imaging results will be communicated to you by MyChart, letter or phone within 4 business days after the clinic has received the results. If you do not hear from us within 7 days, please contact the clinic through MyChart or phone. If you have a critical or abnormal lab result, we will notify you by phone as soon as possible.  Submit refill requests through [employer] or call your pharmacy and they will forward the refill request to us. Please allow 3 business days for your refill to be completed.          Additional Information About Your Visit        Care EveryWhere ID     This is your  Care EveryWhere ID. This could be used by other organizations to access your [facility] medical records  [id number]         Blood Pressure from Last 3 Encounters:   06/06/18 102/65   06/02/18 103/66   06/01/18 113/73    Weight from Last 3 Encounters:   06/12/18 68.9 kg (152 lb)   06/06/18 68.9 kg (152 lb)   06/05/18 68.9 kg (152 lb)              We Performed the Following     APPLY HAND/WRIST CAST     C OT ORTHOTICS/PROSTH MGMT &/TRAING SBSQ ENCTR, EA 15 MIN     MANUAL THER TECH,1+REGIONS,EA 15 MIN     NEUROMUSCULAR RE-EDUCATION     THERAPEUTIC EXERCISES        Primary Care Provider Office Phone # Fax #    Leatha KHUSHI Aguilar [phone]       [facility] [address] [place] [address]        Equal Access to Services     KRISTI GAO : Hadii merissa churcho Solopez, waaxda luqadaha, qaybta kaalmada liborio, jacob isabel . So [facility] [phone].    ATENCIÓN: Si habla español, tiene a treadwell disposición servicios gratuitos de asistencia lingüística. Bon al [phone].    We comply with applicable federal civil rights laws and [place] laws. We do not discriminate on the basis of race, color, national origin, age, disability, sex, sexual orientation, or gender identity.            Thank you!     Thank you for choosing [facility] HAND THERAPY  for your care. Our goal is always to provide you with excellent care. Hearing back from our patients is one way we can continue to improve our services. Please take a few minutes to complete the written survey that you may receive in the mail after your visit with us. Thank you!             Your Updated Medication List - Protect others around you: Learn how to safely use, store and throw away your medicines at www.disposemymeds.org.          This list is accurate as of 7/6/18  3:08 PM.  Always use your most recent med list.                   Brand Name Dispense Instructions for use Diagnosis    acetaminophen 325 MG tablet     TYLENOL    250 tablet    Take 2 tablets by mouth every 4 hours as needed (fever greater than 102?F).    Abdominal pain, other specified site       EPINEPHrine 0.3 MG/0.3ML injection 2-pack    EPIPEN/ADRENACLICK/or ANY BX GENERIC EQUIV    0.6 mL    Inject 0.3 mLs (0.3 mg) into the muscle once as needed for anaphylaxis        ibuprofen 400-800 mg tablet    ADVIL,MOTRIN     Take 1-2 tablets by mouth every 6 hours as needed (cramping).    Abdominal pain, other specified site

## 2018-07-06 NOTE — MR AVS SNAPSHOT
After Visit Summary   7/6/2018    Jennifer Roman    MRN: [id number]           Patient Information     Date Of Birth          1985        Visit Information        Provider Department      7/6/2018 8:40 AM Jerardo De Anda [facility]        Today's Diagnoses     Closed displaced fracture of proximal phalanx of left little finger with routine healing, subsequent encounter    -  1       Follow-ups after your visit        Additional Services     HAND THERAPY Occupational Therapy or Physical Therapy       Hand Therapy Referral                  Follow-up notes from your care team     Return in about 3 weeks (around 7/24/2018).      Your next 10 appointments already scheduled     Jul 24, 2018  8:30 AM CDT   (Arrive by 8:15 AM)   RETURN HAND with Olivia Malloy MD   [facility] ([facility])    [address] [address] [phone]              Who to contact     Please call your clinic at [phone] to:    Ask questions about your health    Make or cancel appointments    Discuss your medicines    Learn about your test results    Speak to your doctor            Additional Information About Your Visit        Care EveryWhere ID     This is your Care EveryWhere ID. This could be used by other organizations to access your [facility] medical records  [id number]         Blood Pressure from Last 3 Encounters:   06/06/18 102/65   06/02/18 103/66   06/01/18 113/73    Weight from Last 3 Encounters:   06/12/18 68.9 kg (152 lb)   06/06/18 68.9 kg (152 lb)   06/05/18 68.9 kg (152 lb)              We Performed the Following     HAND THERAPY Occupational Therapy or Physical Therapy        Primary Care Provider Office Phone # Fax #    Leatha Aguilar [phone]       [facility] [address] [facility] [address]        Equal Access to Services     KRISTI GAO AH: Robinson michelle  aroldo Godfrey, mandoda osmaradaha, qagradyta kaleon capone, jacob romano serafernando ana paularobert lakaylenechrista asaf. So [facility] [phone].    ATENCIÓN: Si bookerla rene, tiene a treadwell disposición servicios gratuitos de asistencia lingüística. Bon al [phone].    We comply with applicable federal civil rights laws and [place] laws. We do not discriminate on the basis of race, color, national origin, age, disability, sex, sexual orientation, or gender identity.            Thank you!     Thank you for choosing [facility]  for your care. Our goal is always to provide you with excellent care. Hearing back from our patients is one way we can continue to improve our services. Please take a few minutes to complete the written survey that you may receive in the mail after your visit with us. Thank you!             Your Updated Medication List - Protect others around you: Learn how to safely use, store and throw away your medicines at www.disposemymeds.org.          This list is accurate as of 7/6/18 11:59 PM.  Always use your most recent med list.                   Brand Name Dispense Instructions for use Diagnosis    acetaminophen 325 MG tablet    TYLENOL    250 tablet    Take 2 tablets by mouth every 4 hours as needed (fever greater than 102?F).    Abdominal pain, other specified site       EPINEPHrine 0.3 MG/0.3ML injection 2-pack    EPIPEN/ADRENACLICK/or ANY BX GENERIC EQUIV    0.6 mL    Inject 0.3 mLs (0.3 mg) into the muscle once as needed for anaphylaxis        ibuprofen 400-800 mg tablet    ADVIL,MOTRIN     Take 1-2 tablets by mouth every 6 hours as needed (cramping).    Abdominal pain, other specified site

## 2018-07-06 NOTE — LETTER
7/6/2018       RE: Jennifer Roman  [address] [facility] [address]     Dear Colleague,    Thank you for referring your patient, Jennifer Roman, to the [facility] at [facility]. Please see a copy of my visit note below.    Follow-up left small finger CRPP.  Doing well, less pain.  No fevers or chills.  Her splint was adjusted per OT.  She has been working.  Restrictions have been effective for her at work.    The past medical history was reviewed and documented in the chart.  This includes medications, surgeries, social history as well as review of systems.    Ossicle exam left hand, pin sites are clean.  No signs of any infection.  Fracture is nontender now with respect to the proximal phalanx left small finger.  No motor, no sensory deficits are noted.  No significant atrophy.  There is brisk capillary refill in all digits and a palpable radial pulse.  No overlying skin changes noted.    X-rays of the left small finger, AP, lateral, oblique demonstrate progressive healing, acceptable alignment.    Impression:  Status post closed reduction, percutaneous pinning left small finger proximal phalangeal fracture      Plan:  Jennifer I reviewed the x-rays today.  I think we can get these pins out.  That was done today without any difficulty.  She tolerated that no problem.  She will continue with her splint but can wean out of the splint.  We discussed a home exercise program working on gentle range of motion.  She is also being seen in occupational therapy.  She should continue with therapy as well.  We will have her see Dr. Malloy back in 2-3 weeks to reevaluate.      Again, thank you for allowing me to participate in the care of your patient.      Sincerely,    Jerardo De Anda MD

## 2018-07-06 NOTE — PROGRESS NOTES
Follow-up left small finger CRPP.  Doing well, less pain.  No fevers or chills.  Her splint was adjusted per OT.  She has been working.  Restrictions have been effective for her at work.    The past medical history was reviewed and documented in the chart.  This includes medications, surgeries, social history as well as review of systems.    Ossicle exam left hand, pin sites are clean.  No signs of any infection.  Fracture is nontender now with respect to the proximal phalanx left small finger.  No motor, no sensory deficits are noted.  No significant atrophy.  There is brisk capillary refill in all digits and a palpable radial pulse.  No overlying skin changes noted.    X-rays of the left small finger, AP, lateral, oblique demonstrate progressive healing, acceptable alignment.    Impression:  Status post closed reduction, percutaneous pinning left small finger proximal phalangeal fracture      Plan:  Jennifer I reviewed the x-rays today.  I think we can get these pins out.  That was done today without any difficulty.  She tolerated that no problem.  She will continue with her splint but can wean out of the splint.  We discussed a home exercise program working on gentle range of motion.  She is also being seen in occupational therapy.  She should continue with therapy as well.  We will have her see Dr. Malloy back in 2-3 weeks to reevaluate.

## 2018-07-06 NOTE — NURSING NOTE
Reason For Visit:   Chief Complaint   Patient presents with     Surgical Followup     DOS 6/6/18 S/P Open Reduction Percutaneous Pinning Left Small Finger Proximal Phalanx Irrigation and Debridement     Results     Xrays, pins out. Pt stated that the pins have become more annoying.        Primary MD: Leatha Aguilar    Age: 32 year old    ?  No        Pain Assessment  Patient Currently in Pain: Yes  0-10 Pain Scale: 2  Primary Pain Location: Hand  Pain Orientation: Left               QuickDASH Assessment  QuickDASH Main 6/12/2018   1.Open a tight or new jar. Unable   2. Do heavy household chores (e.g., wash walls, floors) Unable   3. Carry a shopping bag or briefcase. Severe difficulty   4. Wash your back. Unable   5. Use a knife to cut food. Unable   6. Recreational activities in which you take some force or impact through your arm, shoulder or hand (e.g., golf, hammering, tennis, etc.). Unable   7. During the past week, to what extent has your arm, shoulder or hand problem interfered with your normal social activities with family, friends, neighbours or groups? Quite a bit   8. During the past week, were you limited in your work or other regular daily activities as a result of your arm, shoulder or hand problem? Very limited   9. Arm, shoulder or hand pain. Moderate   10.Tingling (pins and needles) in your arm,shoulder or hand. None   11. During the past week, how much difficulty have you had sleeping because of the pain in your arm, shoulder or hand? ([place] number) No difficulty   Quickdash Ability Score 70.45          Current Outpatient Prescriptions   Medication Sig Dispense Refill     acetaminophen (TYLENOL) 325 MG tablet Take 2 tablets by mouth every 4 hours as needed (fever greater than 102 F). 250 tablet      EPINEPHrine 0.3 MG/0.3ML injection 2-pack Inject 0.3 mLs (0.3 mg) into the muscle once as needed for anaphylaxis (Patient not taking: Reported on 6/29/2018) 0.6 mL 1     ibuprofen  (ADVIL,MOTRIN) 400-800 mg tablet Take 1-2 tablets by mouth every 6 hours as needed (cramping).         No Known Allergies    Btete Tabares LPN

## 2018-07-06 NOTE — PROGRESS NOTES
"SOAP note objective information for 7/6/2018.      Diagnosis: Left small finger proximal phalanx kevin     DOI: 06/01/2018  DOS: 06/06/18  Procedure:  S/P Open Reduction Percutaneous Pinning Left Small Finger Proximal Phalanx Irrigation and Debridement  Post:  1 month  Referring MD: Dr. Olivia Malloy    7/6/2018  PINS OUT, new MD orders, see chart    Objective:  Pain Level Report  VAS(0-10) 6/29/2018 7/6   At Rest: 2/10 1-2/10   With Use: 4/10 8/10 with motion today     Report of Pain:  Location:  small finger  Pain Quality:  Sore, sharp  Frequency: intermittent    Pain is worst:  Movement and activity   Exacerbated by:  Activity and moving around  Relieved by:  cold, NSAIDs and rest  Progression:  Gradually improving  Finger Edema  Circumference:  (Measured in cm)    6/29/2018   Location: Small finger R L   P1 4.8 6.2   PIP 4.9 6   P2 4.5 5.5   DIP 4.2 5     Sensation: Decreased Ulnar Nerve distribution - numbness and \"blood pulsing\" sensation noted in small finger    ROM:  Pain Report:  - none    + mild    ++ moderate    +++ severe   Small Finger  6/29/2018    AROM(PROM) R L L   MCP HE/95 -40/50 ~20/65   PIP 0/95 -25/25 Very minimal, can feel it move but did not measure   DIP 0/95 -18/23 About the same, with blocking today   ELLIOTT        Strength:   Contraindicated    Plan:  Frequency:  1 X week, once daily  Duration:  for 10 weeks    Treatment Plan:   Modalities:  US, Fluidotherapy and Paraffin  Therapeutic Exercise:  AROM, AAROM, PROM, Tendon Gliding, Blocking, Reverse Blocking, Place and Hold, Extensor Tracking, Isotonics and Isometrics  Neuromuscular re-education:  Nerve Gliding, Desensitization and Kinesiotaping  Manual Techniques:  Joint mobilization, Scar mobilization, Friction massage, Myofascial release and Manual edema mobilization  Orthotic Fabrication:  Static orthosis and Hand based orthosis  Self Care:  Self Care Tasks  Discharge Plan:  Achieve all LTG.  Independent in home treatment program.  Reach " maximal therapeutic benefit.    Home Exercise Program:  7/6/2018  Start motion, all planes, A/PROM as tolerated: holding balls, turning in finger tips, open and close hand       Next Visit:  Add in lore straps for when home.

## 2018-07-17 ENCOUNTER — THERAPY VISIT (OUTPATIENT)
Dept: OCCUPATIONAL THERAPY | Facility: CLINIC | Age: 33
End: 2018-07-17
Payer: COMMERCIAL

## 2018-07-17 DIAGNOSIS — S62.646D CLOSED NONDISPLACED FRACTURE OF PROXIMAL PHALANX OF RIGHT LITTLE FINGER WITH ROUTINE HEALING, SUBSEQUENT ENCOUNTER: ICD-10-CM

## 2018-07-17 DIAGNOSIS — M25.642 STIFFNESS OF FINGER JOINT, LEFT: ICD-10-CM

## 2018-07-17 DIAGNOSIS — M79.645 PAIN OF FINGER OF LEFT HAND: ICD-10-CM

## 2018-07-17 PROCEDURE — 97140 MANUAL THERAPY 1/> REGIONS: CPT | Mod: GO | Performed by: OCCUPATIONAL THERAPIST

## 2018-07-17 PROCEDURE — 97110 THERAPEUTIC EXERCISES: CPT | Mod: GO | Performed by: OCCUPATIONAL THERAPIST

## 2018-07-17 NOTE — PROGRESS NOTES
SOAP Note objective information for 7/17/2018    ROM:  Pain Report:  - none    + mild    ++ moderate    +++ severe   Small Finger  6/29/2018 7/17/18   AROM(PROM) R L L L   MCP HE/95 -40/50 ~20/65 -20/56   PIP 0/95 -25/25 Very minimal, can feel it move but did not measure -10/25   DIP 0/95 -18/23 About the same, with blocking today -12/18   ELLIOTT    57   Please refer to the daily flowsheet for treatment today, total treatment time and time spent performing 1:1 timed codes.

## 2018-07-24 DIAGNOSIS — S62.609A FINGER FRACTURE, RIGHT: Primary | ICD-10-CM

## 2018-07-31 ENCOUNTER — OFFICE VISIT (OUTPATIENT)
Dept: ORTHOPEDICS | Facility: CLINIC | Age: 33
End: 2018-07-31
Payer: COMMERCIAL

## 2018-07-31 ENCOUNTER — RADIANT APPOINTMENT (OUTPATIENT)
Dept: GENERAL RADIOLOGY | Facility: CLINIC | Age: 33
End: 2018-07-31
Attending: ORTHOPAEDIC SURGERY
Payer: COMMERCIAL

## 2018-07-31 VITALS — WEIGHT: 152 LBS | HEIGHT: 66 IN | BODY MASS INDEX: 24.43 KG/M2

## 2018-07-31 DIAGNOSIS — S62.617D CLOSED DISPLACED FRACTURE OF PROXIMAL PHALANX OF LEFT LITTLE FINGER WITH ROUTINE HEALING, SUBSEQUENT ENCOUNTER: Primary | ICD-10-CM

## 2018-07-31 DIAGNOSIS — M79.645 PAIN OF FINGER OF LEFT HAND: ICD-10-CM

## 2018-07-31 DIAGNOSIS — S62.609A FINGER FRACTURE, RIGHT: ICD-10-CM

## 2018-07-31 ASSESSMENT — ENCOUNTER SYMPTOMS
BREAST MASS: 0
SKIN CHANGES: 0
POOR WOUND HEALING: 0
HOT FLASHES: 0
DECREASED LIBIDO: 1
NAIL CHANGES: 0
BREAST PAIN: 1

## 2018-07-31 NOTE — LETTER
[facility]  [address]  [address] [facility] [address]          July 31, 2018    RE:  Jennifer Roman                                                                                                                                                       [address] [place] [address]            To whom it may concern:    Jennifer Roman is under my professional care for her left small finger. She  may return to work with the following: These restrictions are in effect beginning 7/31/18.    When the patient returns to work, the following restrictions apply until 8/20/18:   Lift, carry, push, and pull no more than:  0-10 lbs.      Sincerely,        Olivia Malloy MD

## 2018-07-31 NOTE — NURSING NOTE
"Reason For Visit:   Chief Complaint   Patient presents with     Left Hand - Surgical Followup     Surgical Followup     s/p CRPP left small finger, DOS:6/6/18       Primary MD: Leatha Aguilar  Ref. MD: Established    Age: 32 year old    ?  No      Ht 1.676 m (5' 6\")  Wt 68.9 kg (152 lb)  BMI 24.53 kg/m2      Pain Assessment  Patient Currently in Pain: Denies (pain only with some movements)    Hand Dominance Evaluation  Hand Dominance: Right      force  R hand pincher force: 5.897 kg (13 lb)  R hand  level 3 force: 27.2 kg (60 lb)  L hand pincher force: 5.443 kg (12 lb)  L hand  level 3 force: 13.6 kg (30 lb)    QuickDASH Assessment  QuickDASH Main 7/31/2018   1.Open a tight or new jar. Moderate difficulty   2. Do heavy household chores (e.g., wash walls, floors) Moderate difficulty   3. Carry a shopping bag or briefcase. Moderate difficulty   4. Wash your back. Mild difficulty   5. Use a knife to cut food. Moderate difficulty   6. Recreational activities in which you take some force or impact through your arm, shoulder or hand (e.g., golf, hammering, tennis, etc.). Severe difficulty   7. During the past week, to what extent has your arm, shoulder or hand problem interfered with your normal social activities with family, friends, neighbours or groups? Slightly   8. During the past week, were you limited in your work or other regular daily activities as a result of your arm, shoulder or hand problem? Very limited   9. Arm, shoulder or hand pain. Mild   10.Tingling (pins and needles) in your arm,shoulder or hand. None   11. During the past week, how much difficulty have you had sleeping because of the pain in your arm, shoulder or hand? ([place] number) No difficulty   Quickdash Ability Score 38.63          Current Outpatient Prescriptions   Medication Sig Dispense Refill     acetaminophen (TYLENOL) 325 MG tablet Take 2 tablets by mouth every 4 hours as needed (fever greater than 102 F). 250 " tablet      EPINEPHrine 0.3 MG/0.3ML injection 2-pack Inject 0.3 mLs (0.3 mg) into the muscle once as needed for anaphylaxis (Patient not taking: Reported on 6/29/2018) 0.6 mL 1     ibuprofen (ADVIL,MOTRIN) 400-800 mg tablet Take 1-2 tablets by mouth every 6 hours as needed (cramping).         No Known Allergies    Janelle Arizmendi, ATC

## 2018-07-31 NOTE — MR AVS SNAPSHOT
"              After Visit Summary   7/31/2018    Jennfier Roman    MRN: [id number]           Patient Information     Date Of Birth          1985        Visit Information        Provider Department      7/31/2018 8:30 AM Olivia Malloy MD [facility]         Follow-ups after your visit        Your next 10 appointments already scheduled     Aug 21, 2018  7:30 AM CDT   (Arrive by 7:15 AM)   RETURN HAND with Olivia Malloy MD   [facility] [place])    [address]  [address] [facility] [address] [phone]              Who to contact     Please call your clinic at [phone] to:    Ask questions about your health    Make or cancel appointments    Discuss your medicines    Learn about your test results    Speak to your doctor            Additional Information About Your Visit        Care EveryWhere ID     This is your Care EveryWhere ID. This could be used by other organizations to access your [place] medical records  [id number]        Your Vitals Were     Height BMI (Body Mass Index)                1.676 m (5' 6\") 24.53 kg/m2           Blood Pressure from Last 3 Encounters:   06/06/18 102/65   06/02/18 103/66   06/01/18 113/73    Weight from Last 3 Encounters:   07/31/18 68.9 kg (152 lb)   06/12/18 68.9 kg (152 lb)   06/06/18 68.9 kg (152 lb)              Today, you had the following     No orders found for display       Primary Care Provider Office Phone # Fax #    Leatha Aguilar [phone]       [facility] [address] [facility] [address]        Equal Access to Services     [facility]: Hadii aad ku hadasho Soomaali, waaxda luqadaha, qaybta kaalmada adeegwillie, jacob isabel . So [facility] [phone].    ATENCIÓN: Si habla español, tiene a treadwell disposición servicios gratuitos de asistencia lingüística. Llame al [phone].    We comply with applicable " federal civil rights laws and [place] laws. We do not discriminate on the basis of race, color, national origin, age, disability, sex, sexual orientation, or gender identity.            Thank you!     Thank you for choosing [facility]  for your care. Our goal is always to provide you with excellent care. Hearing back from our patients is one way we can continue to improve our services. Please take a few minutes to complete the written survey that you may receive in the mail after your visit with us. Thank you!             Your Updated Medication List - Protect others around you: Learn how to safely use, store and throw away your medicines at www.disposemymeds.org.          This list is accurate as of 7/31/18  9:30 AM.  Always use your most recent med list.                   Brand Name Dispense Instructions for use Diagnosis    acetaminophen 325 MG tablet    TYLENOL    250 tablet    Take 2 tablets by mouth every 4 hours as needed (fever greater than 102?F).    Abdominal pain, other specified site       EPINEPHrine 0.3 MG/0.3ML injection 2-pack    EPIPEN/ADRENACLICK/or ANY BX GENERIC EQUIV    0.6 mL    Inject 0.3 mLs (0.3 mg) into the muscle once as needed for anaphylaxis        ibuprofen 400-800 mg tablet    ADVIL,MOTRIN     Take 1-2 tablets by mouth every 6 hours as needed (cramping).    Abdominal pain, other specified site

## 2018-07-31 NOTE — PROGRESS NOTES
"[facility]  Orthopedics  Olivia Malloy MD  2018     Name: Jennifer Roman  MRN: [id number]  Age: 32 year old  : 1985  Referring provider: Olivia Malloy     Chief Complaint: Surgical Followup of the Left Hand and Surgical Followup (s/p CRPP left small finger, DOS:18)    History of Present Illness:   Jennifer Roman is a 32 year old, right handed female who presents today for follow-up regarding a closed fracture of the proximal phalanx left little finger 2 months status-post open reduction percutaneous pinning of the phalanx irrigation and debridement on 18. The injury occurred on 18 after the finger was crushed between a wedge of asphalt and the bed of her truck. She was last evaluated by Dr. De Anda, on 18, at which time she was doing post-operatively well. The pins were removed without any complication. She was advised to continue at home exercise and occupational therapy.     Today, she notes that her incision sites are itchy and tender. She is going to therapy twice a week with massaging and exercises at home. Due to childcare issues she is now going to therapy twice a week. The finger is still very stiff and painful to actively push into a flexed position. She is wearing a splint at work about 8 hours a day which immobilizes the finger.     Physical Examination:  Physical Examination:  VITALS: Ht 1.676 m (5' 6\")  Wt 68.9 kg (152 lb)  BMI 24.53 kg/m2  QUICKDASH: 38.63   force  R hand pincher force: 5.897 kg (13 lb)  R hand  level 3 force: 27.2 kg (60 lb)  L hand pincher force: 5.443 kg (12 lb)  L hand  level 3 force: 13.6 kg (30 lb)  GENERAL: Healthy-appearing female in no acute distress.  Alert and oriented times three.  RESPIRATORY: Breathing is regular and non-labored on room air.  Left upper extremity: 10 degree arc of motion at PIP joint of left small finger.  No malrotation or malalignment.  No scissoring.  Intact FDP, FDS, and " extensor mechanism.  Radial and ulnar digital nerve sensation intact to light touch.  Finger is warm and well-perfused with capillary refill less than 2 seconds.   SKIN: Intact      Radiographs:   Radiographs of the Left Finger - 2 views (07/31/2018)  Impression: Interval removal of fixation pins for known fracture from  the fifth finger proximal phalanx with interval healing.    I have independently reviewed the above imaging studies; the results were discussed with the patient.     Assessment:   32 year old, right handed female 2 months status-post CRPP of left small finger. She is still experiencing pain with significant stiffness.    Plan:   After discussion of her condition and radiographs, I recommended she do her best to continue occupational therapy twice a week with more passive stretching. Possibly add a statis progressie splint to apply gentle force to the finger. We updated her work restrictions. All the patient's questions were answered and she elected to proceed with my recommendations. She voiced understanding of the plan going forward and will follow-up in 3 weeks for reassessment.     Scribe Disclosure:   I, Adolph Arevalo, am serving as a scribe to document services personally performed by Olivia Malloy MD at this visit, based upon the provider's statements to me. All documentation has been reviewed by the aforementioned provider prior to being entered into the official medical record.     Portions of this medical record were completed by a scribe. UPON MY REVIEW AND AUTHENTICATION BY ELECTRONIC SIGNATURE, this confirms (a) I performed the applicable clinical services, and (b) the record is accurate.

## 2018-08-06 DIAGNOSIS — S62.609A FINGER FRACTURE: Primary | ICD-10-CM

## 2018-08-16 ENCOUNTER — THERAPY VISIT (OUTPATIENT)
Dept: OCCUPATIONAL THERAPY | Facility: CLINIC | Age: 33
End: 2018-08-16
Payer: COMMERCIAL

## 2018-08-16 DIAGNOSIS — M25.642 STIFFNESS OF FINGER JOINT, LEFT: ICD-10-CM

## 2018-08-16 DIAGNOSIS — M79.645 PAIN OF FINGER OF LEFT HAND: ICD-10-CM

## 2018-08-16 DIAGNOSIS — S62.617D CLOSED DISPLACED FRACTURE OF PROXIMAL PHALANX OF LEFT LITTLE FINGER WITH ROUTINE HEALING, SUBSEQUENT ENCOUNTER: ICD-10-CM

## 2018-08-16 PROCEDURE — 97110 THERAPEUTIC EXERCISES: CPT | Mod: GO | Performed by: OCCUPATIONAL THERAPIST

## 2018-08-16 PROCEDURE — 97763 ORTHC/PROSTC MGMT SBSQ ENC: CPT | Mod: GO | Performed by: OCCUPATIONAL THERAPIST

## 2018-08-16 PROCEDURE — 97140 MANUAL THERAPY 1/> REGIONS: CPT | Mod: GO | Performed by: OCCUPATIONAL THERAPIST

## 2018-08-16 NOTE — LETTER
[facility]  [address], [address]  [place] [address]  [phone]    2018    Re: Jennifer Roman   :   1985  MRN:  [id number]   REFERRING PHYSICIAN:   Jerardo CANNON  [facility]    Date of Initial Evaluation:  2018  Visits:  Rxs Used: 4  Reason for Referral:     Stiffness of finger joint, left  Pain of finger of left hand  Closed displaced fracture of proximal phalanx of left little finger with routine healing, subsequent encounter      Hand Therapy Progress Note  Current Date:  2018  Reporting Period: 18 to 2018    Diagnosis: Left small finger proximal phalanx fracutre     DOI: 2018  DOS: 18  Procedure:  S/P Open Reduction Percutaneous Pinning Left Small Finger Proximal Phalanx Irrigation and Debridement  Referring MD: Dr. Olivia Malloy    Subjective:  Jennifer Roman is a 32 year old right hand dominant female.    S:  Subjective changes as noted by patient:  It's ok.  I've been working on it a lot at home.  Able to get motion here (MP), able to passively move this knuckle (PIP),but not actively.  Functional changes noted by patient: using it as much as I can, but it tends to not grasp things     Response to previous treatment:  good  Patient has noted adverse reaction to:   None      Objective:  Pain Level Report  VAS(0-10) 2018   At Rest: 2/10 0/10   With Use: 4/10 4/10         Re: Jennifer Roman   :   1985      Report of Pain:  Location:  small finger  Pain Quality:  aching  Frequency: intermittent    Pain is worst:  Movement and activity   Exacerbated by:  Activity and moving around  Relieved by:  cold, NSAIDs and rest  Progression:  Gradually improving  Scar: significant adherence present, added dycem for scar mobilization  Finger Edema: 18: minimal edema present per visual observation, NT  Circumference:  (Measured in cm)    2018   Location: Small finger R  "L   P1 4.8 6.2   PIP 4.9 6   P2 4.5 5.5   DIP 4.2 5     Sensation: Decreased Ulnar Nerve distribution - numbness and \"blood pulsing\" sensation noted in small finger 18: minimal change in sensation    ROM:  Pain Report:  - none    + mild    ++ moderate    +++ severe   Small Finger  2018   AROM(PROM) R L L L L   MCP HE/ -40/50 ~/ - 0/60   PIP 0 - Very minimal, can feel it move but did not measure -10/25 037 (52), p tx 45   DIP  - About the same, with blocking today - 0/15   ELLIOTT    57 112       Strength:   NT due to AROM limitations (small finger unable to grasp dynamometer)    Assessment:  Response to therapy has been improvement to:  ROM of Fingers: MP joint - Ext, PIP joint - Ext, DIP joint - Flex  Pain:  frequency is less, intensity of pain is decreased, duration of pain is decreased and less tender over affected area    Overall Assessment:  Patient's symptoms are resolving.  Patient is ready to progress to more complex exercises.  Re: Jennifer Carina Jessie   :   1985    Patient is ready to progress to next level of protocol.  Patient is becoming more independent in home exercise program  Patient would benefit from continued therapy to achieve rehab potential  STG/LTG:  STGoals have been reviewed;  see goal sheet for details and updates.  LTGoals have been reviewed;  see goal sheet for details and updates.    I have re-evaluated this patient and find that the nature, scope, duration and intensity of the therapy is appropriate for the medical condition of the patient.    Plan:  Frequency:  2 X week, once daily  Duration:  for 4 weeks    Treatment Plan:   Modalities:  US, Fluidotherapy and Paraffin  Therapeutic Exercise:  AROM, AAROM, PROM, Tendon Gliding, Blocking, Reverse Blocking, Place and Hold, Extensor Tracking, Isotonics and Isometrics  Neuromuscular re-education:  Nerve Gliding, Desensitization and Kinesiotaping  Manual Techniques:  " Joint mobilization, Scar mobilization, Friction massage, Myofascial release and Manual edema mobilization  Orthotic Fabrication:  Static orthosis and Hand based orthosis  Self Care:  Self Care Tasks  Discharge Plan:  Achieve all LTG.  Independent in home treatment program.  Reach maximal therapeutic benefit.    Home Exercise Program:  A/PROM  Dycem with scar mobilization  Tendon gliding  Yoke splint during day    Next Visit:  Check splint  Check finger motion  A/ROM  Scar mobilization      Thank you for your referral.    INQUIRIES  Therapist: JOSE Haynes,CHT   [address], [address] [place] [address]  Phone: [phone]  Fax: [phone]

## 2018-08-20 NOTE — PROGRESS NOTES
"Hand Therapy Progress Note  Current Date:  8/16/2018  Reporting Period: 6/29/18 to 8/16/2018    Diagnosis: Left small finger proximal phalanx stacitre     DOI: 06/01/2018  DOS: 06/06/18  Procedure:  S/P Open Reduction Percutaneous Pinning Left Small Finger Proximal Phalanx Irrigation and Debridement  Referring MD: Dr. Olivia Malloy    Subjective:  Jennifer Roman is a 32 year old right hand dominant female.    S:  Subjective changes as noted by patient:  It's ok.  I've been working on it a lot at home.  Able to get motion here (MP), able to passively move this knuckle (PIP),but not actively.  Functional changes noted by patient: using it as much as I can, but it tends to not grasp things     Response to previous treatment:  good  Patient has noted adverse reaction to:   None      Objective:  Pain Level Report  VAS(0-10) 6/29/2018 8/16/18   At Rest: 2/10 0/10   With Use: 4/10 4/10     Report of Pain:  Location:  small finger  Pain Quality:  aching  Frequency: intermittent    Pain is worst:  Movement and activity   Exacerbated by:  Activity and moving around  Relieved by:  cold, NSAIDs and rest  Progression:  Gradually improving  Scar: significant adherence present, added dycem for scar mobilization  Finger Edema: 8/16/18: minimal edema present per visual observation, NT  Circumference:  (Measured in cm)    6/29/2018   Location: Small finger R L   P1 4.8 6.2   PIP 4.9 6   P2 4.5 5.5   DIP 4.2 5     Sensation: Decreased Ulnar Nerve distribution - numbness and \"blood pulsing\" sensation noted in small finger 8/16/18: minimal change in sensation    ROM:  Pain Report:  - none    + mild    ++ moderate    +++ severe   Small Finger  6/29/2018 7/17/18 8/16/18   AROM(PROM) R L L L L   MCP HE/95 -40/50 ~20/65 -20/56 0/60   PIP 0/95 -25/25 Very minimal, can feel it move but did not measure -10/25 0/37 (52), p tx 45   DIP 0/95 -18/23 About the same, with blocking today -12/18 0/15   ELLIOTT    57 112       Strength:   NT due " to AROM limitations (small finger unable to grasp dynamometer)    Assessment:  Response to therapy has been improvement to:  ROM of Fingers: MP joint - Ext, PIP joint - Ext, DIP joint - Flex  Pain:  frequency is less, intensity of pain is decreased, duration of pain is decreased and less tender over affected area    Overall Assessment:  Patient's symptoms are resolving.  Patient is ready to progress to more complex exercises.  Patient is ready to progress to next level of protocol.  Patient is becoming more independent in home exercise program  Patient would benefit from continued therapy to achieve rehab potential  STG/LTG:  STGoals have been reviewed;  see goal sheet for details and updates.  LTGoals have been reviewed;  see goal sheet for details and updates.    I have re-evaluated this patient and find that the nature, scope, duration and intensity of the therapy is appropriate for the medical condition of the patient.    Plan:  Frequency:  2 X week, once daily  Duration:  for 4 weeks    Treatment Plan:   Modalities:  US, Fluidotherapy and Paraffin  Therapeutic Exercise:  AROM, AAROM, PROM, Tendon Gliding, Blocking, Reverse Blocking, Place and Hold, Extensor Tracking, Isotonics and Isometrics  Neuromuscular re-education:  Nerve Gliding, Desensitization and Kinesiotaping  Manual Techniques:  Joint mobilization, Scar mobilization, Friction massage, Myofascial release and Manual edema mobilization  Orthotic Fabrication:  Static orthosis and Hand based orthosis  Self Care:  Self Care Tasks  Discharge Plan:  Achieve all LTG.  Independent in home treatment program.  Reach maximal therapeutic benefit.    Home Exercise Program:  A/PROM  Dycem with scar mobilization  Tendon gliding  Yoke splint during day    Next Visit:  Check splint  Check finger motion  A/ROM  Scar mobilization

## 2018-08-22 ENCOUNTER — THERAPY VISIT (OUTPATIENT)
Dept: OCCUPATIONAL THERAPY | Facility: CLINIC | Age: 33
End: 2018-08-22
Payer: COMMERCIAL

## 2018-08-22 DIAGNOSIS — M79.645 PAIN OF FINGER OF LEFT HAND: ICD-10-CM

## 2018-08-22 DIAGNOSIS — S62.617D CLOSED DISPLACED FRACTURE OF PROXIMAL PHALANX OF LEFT LITTLE FINGER WITH ROUTINE HEALING, SUBSEQUENT ENCOUNTER: ICD-10-CM

## 2018-08-22 DIAGNOSIS — M25.642 STIFFNESS OF FINGER JOINT, LEFT: ICD-10-CM

## 2018-08-22 PROCEDURE — 97140 MANUAL THERAPY 1/> REGIONS: CPT | Mod: GO | Performed by: OCCUPATIONAL THERAPIST

## 2018-08-22 PROCEDURE — 97110 THERAPEUTIC EXERCISES: CPT | Mod: GO | Performed by: OCCUPATIONAL THERAPIST

## 2018-08-22 NOTE — PROGRESS NOTES
"SOAP note objective information for 8/22/2018.    Objective:  Pain Level Report  VAS(0-10) 6/29/2018 8/16/18   At Rest: 2/10 0/10   With Use: 4/10 4/10     Report of Pain:  Location:  small finger  Pain Quality:  aching  Frequency: intermittent    Pain is worst:  Movement and activity   Exacerbated by:  Activity and moving around  Relieved by:  cold, NSAIDs and rest  Progression:  Gradually improving  Scar: significant adherence present, added dycem for scar mobilization  Finger Edema: 8/16/18: minimal edema present per visual observation, NT  Circumference:  (Measured in cm)    6/29/2018   Location: Small finger R L   P1 4.8 6.2   PIP 4.9 6   P2 4.5 5.5   DIP 4.2 5     Sensation: Decreased Ulnar Nerve distribution - numbness and \"blood pulsing\" sensation noted in small finger 8/16/18: minimal change in sensation    ROM:  Pain Report:  - none    + mild    ++ moderate    +++ severe   Small Finger  6/29/2018 7/17/18 8/16/18 8/22/18   AROM(PROM) R L L L L L   MCP HE/95 -40/50 ~20/65 -20/56 0/60 0/65  Post 0/68   PIP 0/95 -25/25 Very minimal, can feel it move but did not measure -10/25 0/37 (52), p tx 45 0/35   post 0/40   DIP 0/95 -18/23 About the same, with blocking today -12/18 0/15 0/25  Post 0/25   ELLIOTT    57 112 125     Please refer to the daily flowsheet for treatment today, total treatment time and time spent performing 1:1 timed codes.       Home Exercise Program:  A/PROM  Dycem with scar mobilization  Tendon gliding  Yoke splint during day    Next Visit:  Check splint  Check finger motion  A/ROM  Scar mobilization    "

## 2018-08-24 ENCOUNTER — THERAPY VISIT (OUTPATIENT)
Dept: OCCUPATIONAL THERAPY | Facility: CLINIC | Age: 33
End: 2018-08-24
Payer: COMMERCIAL

## 2018-08-24 DIAGNOSIS — S62.617D CLOSED DISPLACED FRACTURE OF PROXIMAL PHALANX OF LEFT LITTLE FINGER WITH ROUTINE HEALING, SUBSEQUENT ENCOUNTER: ICD-10-CM

## 2018-08-24 DIAGNOSIS — M79.645 PAIN OF FINGER OF LEFT HAND: ICD-10-CM

## 2018-08-24 DIAGNOSIS — M25.642 STIFFNESS OF FINGER JOINT, LEFT: ICD-10-CM

## 2018-08-24 PROCEDURE — 97110 THERAPEUTIC EXERCISES: CPT | Mod: GO | Performed by: OCCUPATIONAL THERAPIST

## 2018-09-04 ENCOUNTER — RADIANT APPOINTMENT (OUTPATIENT)
Dept: GENERAL RADIOLOGY | Facility: CLINIC | Age: 33
End: 2018-09-04
Attending: ORTHOPAEDIC SURGERY
Payer: COMMERCIAL

## 2018-09-04 ENCOUNTER — OFFICE VISIT (OUTPATIENT)
Dept: ORTHOPEDICS | Facility: CLINIC | Age: 33
End: 2018-09-04
Payer: COMMERCIAL

## 2018-09-04 VITALS — HEIGHT: 67 IN | WEIGHT: 155.1 LBS | BODY MASS INDEX: 24.34 KG/M2

## 2018-09-04 DIAGNOSIS — S62.617D CLOSED DISPLACED FRACTURE OF PROXIMAL PHALANX OF LEFT LITTLE FINGER WITH ROUTINE HEALING, SUBSEQUENT ENCOUNTER: Primary | ICD-10-CM

## 2018-09-04 DIAGNOSIS — S62.609A FINGER FRACTURE: ICD-10-CM

## 2018-09-04 ASSESSMENT — ENCOUNTER SYMPTOMS
WEIGHT GAIN: 0
EXERCISE INTOLERANCE: 0
SKIN CHANGES: 0
HYPOTENSION: 0
BLOATING: 1
HOT FLASHES: 1
NIGHT SWEATS: 1
SYNCOPE: 0
NAUSEA: 1
SLEEP DISTURBANCES DUE TO BREATHING: 0
JAUNDICE: 0
DIARRHEA: 1
POOR WOUND HEALING: 0
RECTAL PAIN: 0
HEARTBURN: 0
ORTHOPNEA: 0
DECREASED LIBIDO: 1
ALTERED TEMPERATURE REGULATION: 1
FEVER: 1
DECREASED APPETITE: 1
LEG PAIN: 0
PALPITATIONS: 1
HYPERTENSION: 0
LIGHT-HEADEDNESS: 1
ABDOMINAL PAIN: 1
HALLUCINATIONS: 0
WEIGHT LOSS: 0
FATIGUE: 1
CONSTIPATION: 1
INCREASED ENERGY: 1
BLOOD IN STOOL: 0
NAIL CHANGES: 0
BOWEL INCONTINENCE: 0
VOMITING: 0
POLYDIPSIA: 1
POLYPHAGIA: 0
CHILLS: 1

## 2018-09-04 NOTE — PROGRESS NOTES
"Date of Service: 9/04/18    Chief Complaint: Surgical Followup of the Left Hand      Procedure: 6/6/18, Open Reduction Percutaneous Pinning Left Small Finger Proximal Phalanx Irrigation and Debridement    History of Present Illness: The patient is a 32 year old, right-hand dominant female who is now 3 months status post the above stated procedure. At her last evaluation with me on 7/31/18, she expressed itching and tenderness at her incision sites. She was attending physical therapy twice a week with home exercises. She still had stiffness and pain, especially with pushing the finger into a flexed position. She had been wearing a brace 8 hours a day for work. Plan to continue occupational therapy. Today, she reports that things are progressing slowly. Now MP joint with increased flexion actively but PIP only passively. The small finger has been increasingly sensitive to light touch. Intermittently has ecchymosis to the area. She is progressing well with physical therapy, having gains at each appointment.     Physical Examination:  VITALS: Ht 1.7 m (5' 6.93\")  Wt 70.4 kg (155 lb 1.6 oz)  BMI 24.34 kg/m2  Pain is 0 out of 10 on the visual analog scale.  QUICKDASH: 38.63    force  R hand pincher force: 5.897 kg (13 lb)  R hand  level 3 force: 27.2 kg (60 lb)  L hand pincher force: 5.443 kg (12 lb)  L hand  level 3 force: 13.6 kg (30 lb)     GENERAL: Healthy-appearing adult in no acute distress.  Alert and oriented times three.  RESPIRATORY: Breathing is regular and non-labored on room air.  Right upper extremity: AROM: MP joint 70   of flexion, PIP joint 32   of flexion. PROM: PIP: 55   of flexion.   Patient has 5/5 finger abduction, EPL, FPL. Sensation intact to light touch in median, ulnar, and radial nerve distribution. Fingers are warm and well perfused with capillary refill less than 2 seconds.    SKIN: Intact.    Radiographs: Three views of the right hand were obtained today and independently " reviewed.   Impression: Ongoing healing of known fifth digit proximal phalanx  fracture. Bones are osteopenic appearing. Per radiology report.     ASSESSMENT: The patient is a 32 year old, right-hand dominant female who is now 3 months status post left ORIF of small finger with irrigation and debridement, well healed with no evidence of fracture. Still with limited range of motion.     PLAN:   Based on her progression I feel she should continue with occupational therapy. She should no longer need any more films for reassessment. She will obtain a new static progressive splint with therapy; I advised to proceed with full activities in order to progress her range of motion. Follow-up in one month for motion reassessment.     I, Olivia Malloy MD, have reviewed the above note and agree with the scribe's notation as written.   Scribe Disclosure:   I, Adolph Arevalo, am serving as a scribe to document services personally performed by Olivia Malloy MD at this visit, based upon the provider's statements to me. All documentation has been reviewed by the aforementioned provider prior to being entered into the official medical record.     Portions of this medical record were completed by a scribe. UPON MY REVIEW AND AUTHENTICATION BY ELECTRONIC SIGNATURE, this confirms (a) I performed the applicable clinical services, and (b) the record is accurate.

## 2018-09-04 NOTE — MR AVS SNAPSHOT
After Visit Summary   2018    Jennifer Roman    MRN: [id number]           Patient Information     Date Of Birth          1985        Visit Information        Provider Department      2018 11:10 AM Olivia Malloy MD [facility]        Today's Diagnoses     Closed displaced fracture of proximal phalanx of left little finger with routine healing, subsequent encounter    -  1       Follow-ups after your visit        Follow-up notes from your care team     Return in about 4 weeks (around 10/2/2018).      Your next 10 appointments already scheduled     Oct 02, 2018 10:00 AM CDT   (Arrive by 9:45 AM)   RETURN HAND with Olivia Malloy MD   [facility] ([place] [facility])    [address]  [address] [facility] [address] [phone]              Who to contact     Please call your clinic at [phone] to:    Ask questions about your health    Make or cancel appointments    Discuss your medicines    Learn about your test results    Speak to your doctor            Additional Information About Your Visit        MyChart Information     iWelcome is an electronic gateway that provides easy, online access to your medical records. With iWelcome, you can request a clinic appointment, read your test results, renew a prescription or communicate with your care team.     To sign up for iWelcome visit the website at [email or web address]   You will be asked to enter the access code listed below, as well as some personal information. Please follow the directions to create your username and password.     Your access code is: [email or web address]  Expires: 2018  6:32 AM     Your access code will  in 90 days. If you need help or a new code, please contact your [facility] or call [phone] for assistance.        Care EveryWhere ID     This is your Care EveryWhere ID. This could be used by  "other organizations to access your [place] medical records  [id number]        Your Vitals Were     Height BMI (Body Mass Index)                1.7 m (5' 6.93\") 24.34 kg/m2           Blood Pressure from Last 3 Encounters:   06/06/18 102/65   06/02/18 103/66   06/01/18 113/73    Weight from Last 3 Encounters:   09/04/18 70.4 kg (155 lb 1.6 oz)   07/31/18 68.9 kg (152 lb)   06/12/18 68.9 kg (152 lb)              Today, you had the following     No orders found for display       Primary Care Provider Office Phone # Fax #    Leatha KHUSHI Aguilar [phone]       [facility] [address] [address] [address]        Equal Access to Services     KRISTI GAO : Robinson kendrick Solopez, waaxda luqadaha, qaybta kaalmada adeegyada, jacob isabel . So [facility] [phone].    ATENCIÓN: Si habla español, tiene a treadwell disposición servicios gratuitos de asistencia lingüística. Llame al [phone].    We comply with applicable federal civil rights laws and [place] laws. We do not discriminate on the basis of race, color, national origin, age, disability, sex, sexual orientation, or gender identity.            Thank you!     Thank you for choosing [facility]  for your care. Our goal is always to provide you with excellent care. Hearing back from our patients is one way we can continue to improve our services. Please take a few minutes to complete the written survey that you may receive in the mail after your visit with us. Thank you!             Your Updated Medication List - Protect others around you: Learn how to safely use, store and throw away your medicines at www.disposemymeds.org.          This list is accurate as of 9/4/18 11:59 PM.  Always use your most recent med list.                   Brand Name Dispense Instructions for use Diagnosis    acetaminophen 325 MG tablet    TYLENOL    250 tablet    Take 2 tablets by mouth every 4 hours as needed (fever " greater than 102?F).    Abdominal pain, other specified site       EPINEPHrine 0.3 MG/0.3ML injection 2-pack    EPIPEN/ADRENACLICK/or ANY BX GENERIC EQUIV    0.6 mL    Inject 0.3 mLs (0.3 mg) into the muscle once as needed for anaphylaxis        ibuprofen 400-800 mg tablet    ADVIL,MOTRIN     Take 1-2 tablets by mouth every 6 hours as needed (cramping).    Abdominal pain, other specified site

## 2018-09-04 NOTE — NURSING NOTE
"Reason For Visit:   Chief Complaint   Patient presents with     Left Hand - Surgical Followup     DOS 6/6/18. Open reduction percutaneous pinning left small finger proximal phalanx I&D. Been more sensitive for last few weeks       Primary MD: Leatha Aguilar    Age: 32 year old    ?  No      Ht 1.7 m (5' 6.93\")  Wt 70.4 kg (155 lb 1.6 oz)  BMI 24.34 kg/m2      Pain Assessment  Patient Currently in Pain: Yes  0-10 Pain Scale: 4  Primary Pain Location: Finger (Comment which one) (Small)  Pain Orientation: Left  Pain Descriptors:  (Sensative)  Aggravating Factors: Movement    Hand Dominance Evaluation  Hand Dominance: Right          QuickDASH Assessment  QuickDASH Main 9/4/2018   1.Open a tight or new jar. Mild difficulty   2. Do heavy household chores (e.g., wash walls, floors) Mild difficulty   3. Carry a shopping bag or briefcase. Mild difficulty   4. Wash your back. No difficulty   5. Use a knife to cut food. No difficulty   6. Recreational activities in which you take some force or impact through your arm, shoulder or hand (e.g., golf, hammering, tennis, etc.). Moderate difficulty   7. During the past week, to what extent has your arm, shoulder or hand problem interfered with your normal social activities with family, friends, neighbours or groups? Not at all   8. During the past week, were you limited in your work or other regular daily activities as a result of your arm, shoulder or hand problem? Slightly limited   9. Arm, shoulder or hand pain. Mild   10.Tingling (pins and needles) in your arm,shoulder or hand. None   11. During the past week, how much difficulty have you had sleeping because of the pain in your arm, shoulder or hand? ([place] number) No difficulty   Quickdash Ability Score 15.9          Current Outpatient Prescriptions   Medication Sig Dispense Refill     acetaminophen (TYLENOL) 325 MG tablet Take 2 tablets by mouth every 4 hours as needed (fever greater than 102 F). 250 tablet      " EPINEPHrine 0.3 MG/0.3ML injection 2-pack Inject 0.3 mLs (0.3 mg) into the muscle once as needed for anaphylaxis 0.6 mL 1     ibuprofen (ADVIL,MOTRIN) 400-800 mg tablet Take 1-2 tablets by mouth every 6 hours as needed (cramping).         No Known Allergies    All [place], ATC

## 2018-10-27 ENCOUNTER — HOSPITAL ENCOUNTER (EMERGENCY)
Facility: CLINIC | Age: 33
Discharge: HOME | End: 2018-10-28
Attending: NURSE PRACTITIONER | Admitting: NURSE PRACTITIONER
Payer: COMMERCIAL

## 2018-10-27 DIAGNOSIS — S06.0X0A CONCUSSION WITHOUT LOSS OF CONSCIOUSNESS, INITIAL ENCOUNTER: ICD-10-CM

## 2018-10-27 DIAGNOSIS — H57.11 PAIN OF RIGHT EYE: ICD-10-CM

## 2018-10-27 PROCEDURE — 99282 EMERGENCY DEPT VISIT SF MDM: CPT

## 2018-10-27 RX ORDER — TETRACAINE HYDROCHLORIDE 5 MG/ML
SOLUTION OPHTHALMIC
Status: DISCONTINUED
Start: 2018-10-27 | End: 2018-10-28 | Stop reason: HOSPADM

## 2018-10-27 ASSESSMENT — ENCOUNTER SYMPTOMS
VOMITING: 0
EYE PAIN: 1
NAUSEA: 0
HEADACHES: 1

## 2018-10-27 NOTE — ED AVS SNAPSHOT
[facility]    [address]    [facility] [address]    Phone:  [phone]    Fax:  [phone]                                       Jennifer Roman   MRN: [id number]    Department:  [facility]   Date of Visit:  10/27/2018           After Visit Summary Signature Page     I have received my discharge instructions, and my questions have been answered. I have discussed any challenges I see with this plan with the nurse or doctor.    ..........................................................................................................................................  Patient/Patient Representative Signature      ..........................................................................................................................................  Patient Representative Print Name and Relationship to Patient    ..................................................               ................................................  Date                                   Time    ..........................................................................................................................................  Reviewed by Signature/Title    ...................................................              ..............................................  Date                                               Time          22EPIC Rev 08/18

## 2018-10-27 NOTE — ED AVS SNAPSHOT
[facility]    [address]    [facility] [address]    Phone:  [phone]    Fax:  [phone]                                       Jennifer Roman   MRN: [id number]    Department:  [facility]   Date of Visit:  10/27/2018           Patient Information     Date Of Birth          1985        Your diagnoses for this visit were:     Pain of right eye        You were seen by Josef Harden, GERARDO CNP.      Follow-up Information     Follow up with your Eye doctor ASAP.        Follow up with [facility].    Specialty:  EMERGENCY MEDICINE    Why:  If symptoms worsen    Contact information:    [address]  [facility] [address] [phone]      24 Hour Appointment Hotline       To make an appointment at any [facility] clinic, call [phone] ([phone]). If you don't have a family doctor or clinic, we will help you find one. [facility] clinics are conveniently located to serve the needs of you and your family.             Review of your medicines      Our records show that you are taking the medicines listed below. If these are incorrect, please call your family doctor or clinic.        Dose / Directions Last dose taken    acetaminophen 325 MG tablet   Commonly known as:  TYLENOL   Dose:  650 mg   Quantity:  250 tablet        Take 2 tablets by mouth every 4 hours as needed (fever greater than 102?F).   Refills:  0        EPINEPHrine 0.3 MG/0.3ML injection 2-pack   Commonly known as:  EPIPEN/ADRENACLICK/or ANY BX GENERIC EQUIV   Dose:  0.3 mg   Quantity:  0.6 mL        Inject 0.3 mLs (0.3 mg) into the muscle once as needed for anaphylaxis   Refills:  1        ibuprofen 400-800 mg tablet   Commonly known as:  ADVIL,MOTRIN   Dose:  400-800 mg        Take 1-2 tablets by mouth every 6 hours as needed (cramping).   Refills:  0                Orders Needing Specimen  Collection     None      Pending Results     No orders found for last 3 day(s).            Pending Culture Results     No orders found for last 3 day(s).            Pending Results Instructions     If you had any lab results that were not finalized at the time of your Discharge, you can call the ED Lab Result RN at [phone]. You will be contacted by this team for any positive Lab results or changes in treatment. The nurses are available 7 days a week from 10A to 6:30P.  You can leave a message 24 hours per day and they will return your call.        Test Results From Your Hospital Stay               Clinical Quality Measure: Blood Pressure Screening     Your blood pressure was checked while you were in the emergency department today. The last reading we obtained was  BP: 128/76 . Please read the guidelines below about what these numbers mean and what you should do about them.  If your systolic blood pressure (the top number) is less than 120 and your diastolic blood pressure (the bottom number) is less than 80, then your blood pressure is normal. There is nothing more that you need to do about it.  If your systolic blood pressure (the top number) is 120-139 or your diastolic blood pressure (the bottom number) is 80-89, your blood pressure may be higher than it should be. You should have your blood pressure rechecked within a year by a primary care provider.  If your systolic blood pressure (the top number) is 140 or greater or your diastolic blood pressure (the bottom number) is 90 or greater, you may have high blood pressure. High blood pressure is treatable, but if left untreated over time it can put you at risk for heart attack, stroke, or kidney failure. You should have your blood pressure rechecked by a primary care provider within the next 4 weeks.  If your provider in the emergency department today gave you specific instructions to follow-up with your doctor or provider even sooner than that, you should  "follow that instruction and not wait for up to 4 weeks for your follow-up visit.        Thank you for choosing [facility]       Thank you for choosing [facility] for your care. Our goal is always to provide you with excellent care. Hearing back from our patients is one way we can continue to improve our services. Please take a few minutes to complete the written survey that you may receive in the mail after you visit with us. Thank you!        [facility] Information     [facility] lets you send messages to your doctor, view your test results, renew your prescriptions, schedule appointments and more. To sign up, go to [email or web address] . Click on \"Log in\" on the left side of the screen, which will take you to the Welcome page. Then click on \"Sign up Now\" on the right side of the page.     You will be asked to enter the access code listed below, as well as some personal information. Please follow the directions to create your username and password.     Your access code is: [email or web address]  Expires: 2018  6:32 AM     Your access code will  in 90 days. If you need help or a new code, please call your [facility] clinic or [phone].        Care EveryWhere ID     This is your Care EveryWhere ID. This could be used by other organizations to access your [facility] medical records  [id number]        Equal Access to Services     KRISTI GAO : Hadii merissa Godfrey, waaxda luqadaha, qaybta kaalmada adedewey, jacob isabel . So [facility] [phone].    ATENCIÓN: Si habla español, tiene a treadwell disposición servicios gratuitos de asistencia lingüística. Llame al [phone].    We comply with applicable federal civil rights laws and [place] laws. We do not discriminate on the basis of race, color, national origin, age, disability, sex, sexual orientation, or gender identity.            After Visit Summary       This is your record. Keep this with you and show to your community pharmacist(s) " and doctor(s) at your next visit.

## 2018-10-28 VITALS
BODY MASS INDEX: 24.8 KG/M2 | RESPIRATION RATE: 16 BRPM | SYSTOLIC BLOOD PRESSURE: 118 MMHG | WEIGHT: 154.32 LBS | TEMPERATURE: 99.1 F | OXYGEN SATURATION: 100 % | DIASTOLIC BLOOD PRESSURE: 72 MMHG | HEIGHT: 66 IN | HEART RATE: 68 BPM

## 2018-10-28 ASSESSMENT — ENCOUNTER SYMPTOMS
CHILLS: 0
FEVER: 0
NUMBNESS: 0
WEAKNESS: 0

## 2018-10-28 NOTE — ED TRIAGE NOTES
Pt with noted dilated rt pupil, pt states it felt like there was sand in the eye earlier so used some visine which helped sx. When she woke up from nap, discomfort was resolved but then noted differing pupil size. Has been having headaches with pain behind eyes for last week, denies headache currently.

## 2018-10-28 NOTE — ED PROVIDER NOTES
"  History     Chief Complaint:  Eye Problem      HPI   Jennifer Roman is a 32 year old female who presents with eye pain, which felt like a sensation of sand in her eye. This occurred around 1830, and she use some Visine which helped this. When she woke up from the nap her pain was gone, but she noticed at that time that her right pupil was larger than her left. She endorses headaches with pain behind her eyes for the past week, including a headache tonight. She notes she hit her head pretty hard at work last week. Two months ago, she experienced what appeared to be swelling in her right eye. She does have an eye doctor. Patient denies nausea and vomiting.     Allergies:  No known drug allergies.    Medications:    Tylenol  Epinephrine  Advil     Past Medical History:    Abnormal paper smear  Asthma    Past Surgical History:    Closed reduction, percutaneous pinning finger, combined  Dental surgery  Irrigation and debridement finger, combined    Family History:    Cancer    Social History:  Marital Status:   Presents to the ED alone.   Tobacco Use: Former smoker (Quit: 1/1/2011)  Alcohol Use: Socially  PCP: Leatha Aguilar     Review of Systems   Constitutional: Negative for chills and fever.   Eyes: Positive for pain.   Gastrointestinal: Negative for nausea and vomiting.   Neurological: Positive for headaches. Negative for weakness and numbness.   All other systems reviewed and are negative.    Physical Exam   First Vitals:  BP: 128/76  Pulse: 68  Temp: 99.1  F (37.3  C)  Resp: 18  Height: 167.6 cm (5' 6\")  Weight: 70 kg (154 lb 5.2 oz)  SpO2: 100 %      Physical Exam  General: Alert, No obvious discomfort, well kept  Eyes: PERRL, conjunctivae pink no scleral icterus or conjunctival injection. Eye pressures: 21  ENT:   Moist mucus membranes, posterior oropharynx clear without erythema or exudates, No lymphadenopathy, Normal voice  Resp:  Lungs clear to auscultation bilaterally, no " crackles/rubs/wheezes. Good air movement  CV:  Normal rate and rhythm, no murmurs/rubs/gallops  GI:  Abdomen soft and non-distended.  Normoactive BS.  No tenderness, guarding or rebound, No masses  Skin:  Warm, dry.  No rashes or petechiae  Musculoskeletal: No peripheral edema or calf tenderness, Normal gross ROM   Neuro: Alert and oriented to person/place/time, normal sensation  Psychiatric: Normal affect, cooperative, good eye contact    Emergency Department Course     Emergency Department Course:  The patient arrived in triage where vitals were measured and recorded.   The patient was then escorted back to the emergency department.   The patient's medical records were reviewed.  Nursing notes and vitals were reviewed.  2339: I performed an exam of the patient as documented above.  The above workup was undertaken.  Findings and plan explained to the Patient. Patient discharged home, status improved, with instructions regarding supportive care, medications, and reasons to return as well as the importance of close follow-up was reviewed.     Impression & Plan      Medical Decision Making:  Jennifer Roman is a 32 year old female who presents today for evaluation of right eye pain.  She started out with a headache for which she took a nap.  When she woke up she felt like there was some sand in her eye so she used Visine however she did have a headache and noticed that her pupil size was larger in this eye is concerned her therefore she presented for evaluation.  By the time I evaluated her her pupil size was back to normal.  She had a normal intraocular pressure.  Her headache had resolved.  There was no sign of acute angle glaucoma.  She did not have evidence of foreign body.  We discussed glaucoma as a possibility and she is advised to follow-up with her ophthalmologist as soon as possible.  She will plan on following up on Monday if she has return symptoms she is advised with strict return protocols to  the emergency department.  At this point time she appeared to be safe and appropriate for outpatient management and discharge.      Diagnosis:    ICD-10-CM    1. Pain of right eye H57.11        Disposition:  Discharged to home.       I, Sarah Valentin, am serving as a scribe on 10/27/2018 at 11:39 PM to personally document services performed by Josef Harden CNP based on my observations and the provider's statements to me.   [facility] EMERGENCY DEPARTMENT       Josef Harden APRN CNP  10/28/18 0042

## 2019-01-12 ENCOUNTER — APPOINTMENT (OUTPATIENT)
Dept: GENERAL RADIOLOGY | Facility: CLINIC | Age: 34
End: 2019-01-12
Payer: COMMERCIAL

## 2019-01-12 ENCOUNTER — HOSPITAL ENCOUNTER (EMERGENCY)
Facility: CLINIC | Age: 34
Discharge: HOME | End: 2019-01-12
Attending: EMERGENCY MEDICINE | Admitting: EMERGENCY MEDICINE
Payer: COMMERCIAL

## 2019-01-12 ENCOUNTER — OFFICE VISIT (OUTPATIENT)
Dept: URGENT CARE | Facility: URGENT CARE | Age: 34
End: 2019-01-12
Payer: COMMERCIAL

## 2019-01-12 VITALS
SYSTOLIC BLOOD PRESSURE: 143 MMHG | BODY MASS INDEX: 24.91 KG/M2 | WEIGHT: 155 LBS | OXYGEN SATURATION: 100 % | RESPIRATION RATE: 16 BRPM | TEMPERATURE: 97.9 F | DIASTOLIC BLOOD PRESSURE: 84 MMHG | HEIGHT: 66 IN

## 2019-01-12 VITALS — SYSTOLIC BLOOD PRESSURE: 128 MMHG | OXYGEN SATURATION: 99 % | DIASTOLIC BLOOD PRESSURE: 70 MMHG | HEART RATE: 78 BPM

## 2019-01-12 DIAGNOSIS — R06.02 SHORTNESS OF BREATH: Primary | ICD-10-CM

## 2019-01-12 DIAGNOSIS — R06.02 SOB (SHORTNESS OF BREATH): ICD-10-CM

## 2019-01-12 LAB
ALBUMIN SERPL-MCNC: 4.1 G/DL (ref 3.4–5)
ALP SERPL-CCNC: 51 U/L (ref 40–150)
ALT SERPL W P-5'-P-CCNC: 21 U/L (ref 0–50)
ANION GAP SERPL CALCULATED.3IONS-SCNC: 6 MMOL/L (ref 3–14)
AST SERPL W P-5'-P-CCNC: 16 U/L (ref 0–45)
B-HCG FREE SERPL-ACNC: <5 IU/L
BASOPHILS # BLD AUTO: 0.1 10E9/L (ref 0–0.2)
BASOPHILS NFR BLD AUTO: 0.7 %
BILIRUB SERPL-MCNC: 0.3 MG/DL (ref 0.2–1.3)
BUN SERPL-MCNC: 20 MG/DL (ref 7–30)
CALCIUM SERPL-MCNC: 8.7 MG/DL (ref 8.5–10.1)
CHLORIDE SERPL-SCNC: 107 MMOL/L (ref 94–109)
CO2 BLDCOV-SCNC: 24 MMOL/L (ref 21–28)
CO2 SERPL-SCNC: 26 MMOL/L (ref 20–32)
CREAT SERPL-MCNC: 0.76 MG/DL (ref 0.52–1.04)
D DIMER PPP FEU-MCNC: 0.4 UG/ML FEU (ref 0–0.5)
DIFFERENTIAL METHOD BLD: NORMAL
EOSINOPHIL # BLD AUTO: 0.1 10E9/L (ref 0–0.7)
EOSINOPHIL NFR BLD AUTO: 0.9 %
ERYTHROCYTE [DISTWIDTH] IN BLOOD BY AUTOMATED COUNT: 11.9 % (ref 10–15)
GFR SERPL CREATININE-BSD FRML MDRD: >90 ML/MIN/{1.73_M2}
GLUCOSE SERPL-MCNC: 80 MG/DL (ref 70–99)
HCT VFR BLD AUTO: 39.2 % (ref 35–47)
HGB BLD-MCNC: 12.8 G/DL (ref 11.7–15.7)
IMM GRANULOCYTES # BLD: 0 10E9/L (ref 0–0.4)
IMM GRANULOCYTES NFR BLD: 0.1 %
LACTATE BLD-SCNC: 0.5 MMOL/L (ref 0.7–2.1)
LYMPHOCYTES # BLD AUTO: 2.1 10E9/L (ref 0.8–5.3)
LYMPHOCYTES NFR BLD AUTO: 28.1 %
MAGNESIUM SERPL-MCNC: 2.2 MG/DL (ref 1.6–2.3)
MCH RBC QN AUTO: 29.5 PG (ref 26.5–33)
MCHC RBC AUTO-ENTMCNC: 32.7 G/DL (ref 31.5–36.5)
MCV RBC AUTO: 90 FL (ref 78–100)
MONOCYTES # BLD AUTO: 0.6 10E9/L (ref 0–1.3)
MONOCYTES NFR BLD AUTO: 7.7 %
NEUTROPHILS # BLD AUTO: 4.6 10E9/L (ref 1.6–8.3)
NEUTROPHILS NFR BLD AUTO: 62.5 %
NRBC # BLD AUTO: 0 10*3/UL
NRBC BLD AUTO-RTO: 0 /100
PCO2 BLDV: 40 MM HG (ref 40–50)
PH BLDV: 7.4 PH (ref 7.32–7.43)
PLATELET # BLD AUTO: 252 10E9/L (ref 150–450)
PO2 BLDV: 19 MM HG (ref 25–47)
POTASSIUM SERPL-SCNC: 3.8 MMOL/L (ref 3.4–5.3)
PROT SERPL-MCNC: 7.6 G/DL (ref 6.8–8.8)
RBC # BLD AUTO: 4.34 10E12/L (ref 3.8–5.2)
SAO2 % BLDV FROM PO2: 28 %
SODIUM SERPL-SCNC: 139 MMOL/L (ref 133–144)
TROPONIN I BLD-MCNC: 0 UG/L (ref 0–0.08)
TSH SERPL DL<=0.005 MIU/L-ACNC: 1.25 MU/L (ref 0.4–4)
WBC # BLD AUTO: 7.4 10E9/L (ref 4–11)

## 2019-01-12 PROCEDURE — 99285 EMERGENCY DEPT VISIT HI MDM: CPT | Mod: 25

## 2019-01-12 PROCEDURE — 84484 ASSAY OF TROPONIN QUANT: CPT

## 2019-01-12 PROCEDURE — 84702 CHORIONIC GONADOTROPIN TEST: CPT

## 2019-01-12 PROCEDURE — 80053 COMPREHEN METABOLIC PANEL: CPT | Performed by: EMERGENCY MEDICINE

## 2019-01-12 PROCEDURE — 82803 BLOOD GASES ANY COMBINATION: CPT

## 2019-01-12 PROCEDURE — 85379 FIBRIN DEGRADATION QUANT: CPT | Performed by: EMERGENCY MEDICINE

## 2019-01-12 PROCEDURE — 85025 COMPLETE CBC W/AUTO DIFF WBC: CPT | Performed by: EMERGENCY MEDICINE

## 2019-01-12 PROCEDURE — 83735 ASSAY OF MAGNESIUM: CPT | Performed by: EMERGENCY MEDICINE

## 2019-01-12 PROCEDURE — 93005 ELECTROCARDIOGRAM TRACING: CPT

## 2019-01-12 PROCEDURE — 71046 X-RAY EXAM CHEST 2 VIEWS: CPT

## 2019-01-12 PROCEDURE — 83605 ASSAY OF LACTIC ACID: CPT

## 2019-01-12 PROCEDURE — 84443 ASSAY THYROID STIM HORMONE: CPT | Performed by: EMERGENCY MEDICINE

## 2019-01-12 PROCEDURE — 99213 OFFICE O/P EST LOW 20 MIN: CPT | Performed by: FAMILY MEDICINE

## 2019-01-12 RX ORDER — LIDOCAINE 40 MG/G
CREAM TOPICAL
Status: DISCONTINUED | OUTPATIENT
Start: 2019-01-12 | End: 2019-01-12 | Stop reason: HOSPADM

## 2019-01-12 ASSESSMENT — ENCOUNTER SYMPTOMS
ABDOMINAL PAIN: 1
LIGHT-HEADEDNESS: 1
NUMBNESS: 0
SHORTNESS OF BREATH: 1
NAUSEA: 1
BACK PAIN: 1
CHEST TIGHTNESS: 1

## 2019-01-12 ASSESSMENT — MIFFLIN-ST. JEOR: SCORE: 1419.83

## 2019-01-12 NOTE — ED AVS SNAPSHOT
[facility] Emergency Department  [address]  [facility] [address]  Phone:  [phone]  Fax:  [phone]                                    Jennifer Roman   MRN: [id number]    Department:  [facility] Emergency Department   Date of Visit:  1/12/2019           After Visit Summary Signature Page    I have received my discharge instructions, and my questions have been answered. I have discussed any challenges I see with this plan with the nurse or doctor.    ..........................................................................................................................................  Patient/Patient Representative Signature      ..........................................................................................................................................  Patient Representative Print Name and Relationship to Patient    ..................................................               ................................................  Date                                   Time    ..........................................................................................................................................  Reviewed by Signature/Title    ...................................................              ..............................................  Date                                               Time          22EPIC Rev 08/18

## 2019-01-12 NOTE — PROGRESS NOTES
SUBJECTIVE:   Chief Complaint   Patient presents with     Urgent Care     Breathing Problem     Having breathing problem, feels like not getting enough air, feeling throat tightness, feels heavy on lower abdomen, dizzy, lightheadesd, shakey and nausea. Sx started yesterday.     Jennifer Roman is a 33 year old female complains of stomachache yesterday that has improved, then for the past day she has had shortness of breath, difficulty getting a full breath,  Can't expand her chest  Easily winded with walking/ climbing stairs.  Lightheadedness,  Feeling of room movement when she is moving.   Feels shaky, nauseated, weak.    Does not feel like her throat is swelling shut, no tongue or lip swelling  Has history of exercise induced asthma-  Not quite like the wheezing, chest tightness when she has asthma exacerbation  Feels like heart races at times- but her pulse is normal  Not taking birth control-  No history of blood clots,  No family history of DVT or blood clots  No runny / stuffy nose/ ear pain/ sore throat/ fevers/ chills       PMH:  Past Medical History:   Diagnosis Date     Abnormal Pap smear     normal now     Asthma     excercise induced     Complication of anesthesia      PONV (postoperative nausea and vomiting)      Rh incompatibility     A neg     Patient Active Problem List   Diagnosis     Supervision of normal first pregnancy     Hyperemesis     Indication for care in labor or delivery     Normal labor and delivery     Spontaneous vaginal delivery     Pain of finger of left hand     Proximal phalanx fracture of finger     Stiffness of finger joint, right     Stiffness of finger joint, left       ALLERGIES:  Patient has no known allergies.      Current Outpatient Medications on File Prior to Visit:  acetaminophen (TYLENOL) 325 MG tablet Take 2 tablets by mouth every 4 hours as needed (fever greater than 102 F).   EPINEPHrine 0.3 MG/0.3ML injection 2-pack Inject 0.3 mLs (0.3 mg) into the muscle  once as needed for anaphylaxis   ibuprofen (ADVIL,MOTRIN) 400-800 mg tablet Take 1-2 tablets by mouth every 6 hours as needed (cramping).     No current facility-administered medications on file prior to visit.     Social History     Tobacco Use     Smoking status: Former Smoker     Last attempt to quit: 2011     Years since quittin.0     Smokeless tobacco: Never Used   Substance Use Topics     Alcohol use: No     Comment: socially       Family History   Problem Relation Age of Onset     Cancer Paternal Grandfather      Cancer Paternal Grandmother      Cancer Maternal Grandfather        Review Of Systems    Constitutional:  Negative for fevers, chills, has fatigue  Ears/Nose/Throat: negative for earache  , sinus trouble,   sore throat  Respiratory:   shortness of breath, dyspnea on exertion     Cardiovascular: negative for, palpitations, tachycardia and chest pain  Gastrointestinal: negative for vomiting,   and diarrhea, yesterday had abdominal pain  Genitourinary: negative for dysuria and hematuria       OBJECTIVE:  /70   Pulse 78   LMP 2018 (Approximate)   SpO2 99%   Appears well, in moderate  distress.   HEENT:  Ears  Normal ,  Mouth normal  Cranial nerves 2 through 12 grossly intact and No facial droop, no lid lag, normal facial features  NECK:  Supple. No adenopathy or masses in the neck or supraclavicular regions.    EYES: Fundi are normal  , no papilledema, hemorrhages or exudates noted. LAWRENCE. EOM's intact.   HEART: S1 and S2 normal, no murmurs, clicks, gallops or rubs. Regular rate and rhythm. Chest is clear; no wheezes or rales. No edema or JVD.  LUNGS: Clear to auscultation, no wheezes, rhonchi or rales      ABDOMEN  Soft, non-tender, normal bowel sounds, no masses  NEURO: . Mental status normal. Gait and station normal.         Assess/ Plan    Shortness of breath      We discussed possible causes of her symptoms including PE, pneumothorax, bronchospasm, labyrinthitis, viral  syndrome, pneumonia, pericarditis, and the process of trying to evaluate these many possibilities through physical exam, lab, x-ray, EKG,  We also discussed the limited capacity to evaluate these conditions in the urgent care.     Due to her chief concern of shortness of breath and not being able to breath normally I recommended evaluation in the ER (though she has normal O2 sat) for evaluation of PE and if needed Chest CT to evaluate her symptoms  She refused transfer by ambulance- she said she would arrange her own transfer to the ER- she is going to [facility]

## 2019-01-12 NOTE — ED PROVIDER NOTES
History   Chief Complaint:  Shortness of Breath     HPI   Jennifer Roman is an otherwise healthy 33 year old female who presents with shortness of breath. The patient reports that last night she developed shortness of breath while sitting on the couch watching TV. She states that she also developed abdominal pain around that time although this resolved. The shortness of breath has persisted since onset, and is exacerbated by exertion. She notes that she is completely out of breath and her heart is pounding after walking a flight of stairs. The shortness of breath is alleviated to some extent when sitting down. Today, she attests to developing chest tightness, back soreness, nausea, and lightheadedness. She does have a history of anxiety for which she is not taking any medication, although she states she is not under any increased stress. He last menstrual period was just under three weeks ago. She denies chest pain, numbness in her fingers, and leg swelling. The patient has no prior history of blood clots.    CARDIAC RISK FACTORS:  Sex:    Female  Tobacco:   No  Hypertension:   No  Hyperlipidemia:  No  Diabetes:   No  Family History:  No    PE/DVT RISK FACTORS:  Sex:    Female  Hormones:   No  Tobacco:   No  Cancer:   No  Travel:   No  Surgery:   No  Other immobilization: No  Personal history:  No  Family history:  No    Allergies:  No known drug allergies    Medications:    Epinephrine    Past Medical History:    Abnormal Pap smear  Asthma  Complication of anesthesia  PONV  Rh incompatibility  Stiffness of finger joint, bilateral  Pain of finger of left hand  Proximal phalanx fracture of finger  Normal labor and delivery  Hyperemesis     Past Surgical History:    Closed reduction, percutaneous pinning finger, combined  Dental surgery  Irrigation and debridement finger, combined    Family History:    History reviewed. No pertinent family history.     Social History:  Smoking status: Former  "smoker  Alcohol use: No  Marital Status:   [2]    Review of Systems   Respiratory: Positive for chest tightness and shortness of breath.    Cardiovascular: Negative for chest pain and leg swelling.   Gastrointestinal: Positive for abdominal pain (resolved) and nausea.   Musculoskeletal: Positive for back pain.   Neurological: Positive for light-headedness. Negative for numbness (fingers).   All other systems reviewed and are negative.        Physical Exam     Patient Vitals for the past 24 hrs:   BP Temp Temp src Heart Rate Resp SpO2 Height Weight   01/12/19 1732 143/84 97.9  F (36.6  C) Oral 74 16 100 % 1.676 m (5' 6\") 70.3 kg (155 lb)     Physical Exam  General: Appears anxious.  HEENT:   The scalp and head appear normal    The pupils are equal, round, and reactive to light    Extraocular muscles are intact.    The nose is normal.    The oropharynx is normal.      Uvula is in the midline.    Neck:  Normal range of motion.    Lungs:  No pleuritic pain on auscultation. Clear.      No rales, no wheezing.      There is no tachypnea.  Non-labored.  Cardiac: Regular rate.      Normal S1 and S2.      No pathological murmur/rub    Abdomen: Soft. No distension, no localized tenderness or rebound.  MS:  Normal tone. Normal movement of all extremities.   Neurologic:     Normal mentation.  No cranial nerve deficits.  No focal motor or sensory changes.      Speech normal.  Psych:  Awake.     Alert.      Anxious.     Appropriate interactions.  Skin:  No rash.      No lesions.      Emergency Department Course   ECG (18:13:48):  Rate 73 bpm. FL interval 116. QRS duration 90. QT/QTc 410/451. P-R-T axes 6 4 36. Normal sinus rhythm. Normal ECG. Interpreted at 1813 by Yovanny Escobar MD.    Imaging:  Radiographic findings were communicated with the patient who voiced understanding of the findings.    XR Chest 2 Views:  No acute cardiopulmonary disease.  As read by Radiology.     Laboratory:  CBC: WNL (WBC 7.4, HGB 12.8, PLT " 252)  CMP: WNL (Creatinine 0.76)  Magnesium: 2.2  D-dimer: 0.4  TSH: 1.25    1830: ISTAT HCG Quantitative Pregnancy: <5.0            Troponin: 0.00    1831: ISTAT venous gases and lactate: pH 7.40, PCO2 40, PO2 19 (L), Bicarbonate 24, O2 sat 28, Lactic acid 0.5 (L)    Emergency Department Course:  PERC Criteria (PERC Negative) are met:    Is the patient older than 49: No  Is the HR >99: No  Room air sats <95%: No  Prior DVT or PE: No  Recent trauma or surgery (last 4 weeks): No  Hemoptysis: No  Exogenous estrogen: No  Clinical signs of DVT (unilateral leg swelling): No    Low risk patients who are PERC negative have a probability of PE of less than 2%.     Past medical records, nursing notes, and vitals reviewed.  1740: I performed an exam of the patient and obtained history, as documented above.  IV inserted and blood drawn.  EKG obtained, results above.  The patient was sent for a chest x-ray while in the emergency department, findings above.    2124: I rechecked the patient. Explained findings to the patient.    Findings and plan explained to the patient. Patient discharged home with instructions regarding supportive care, medications, and reasons to return. The importance of close follow-up was reviewed.     Impression & Plan    Medical Decision Making:  Jennifer Roman is a 33 year old female who presents with dyspnea. She says its worse with exertion. She was evaluated here, and a broad differential was considered. This included acute PE, occult pneumonia, pneumothorax, asthma attack, COPD, heart failure, heart attack or angina, anemia, dehydration, pregnancy.    At this point, I cannot find any serious underlying cause for her symptoms. However, she was notably anxious on examination and admitted to a history of depression and anxiety, but no current treatment. I am going to recommended close follow-up with her PCP in 48 hours for evaluation. If in the interim she should develop new or worsening  symptoms se should return here.    Diagnosis:    ICD-10-CM   1. SOB (shortness of breath) R06.02       Disposition:  Discharged to home.      Alexander Chin  1/12/2019   [facility] EMERGENCY DEPARTMENT  I, Alexander Chin, am serving as a scribe at 5:40 PM on 1/12/2019 to document services personally performed by Yovanny Escobar MD based on my observations and the provider's statements to me.        Yovanny Escobar MD  01/13/19 0019

## 2019-01-12 NOTE — ED TRIAGE NOTES
"A&Ox4. ABC's intact. Pt c/o SOB that started last night.  This afternoon began to have dizziness, describes it as feeling \"high\", not room spinning or passing out.  Aches in her back and neck with mvmt.    "

## 2019-01-13 LAB — INTERPRETATION ECG - MUSE: NORMAL

## 2019-05-29 PROBLEM — M25.642 STIFFNESS OF FINGER JOINT, LEFT: Status: RESOLVED | Noted: 2018-07-06 | Resolved: 2019-05-29

## 2019-05-29 PROBLEM — M79.645 PAIN OF FINGER OF LEFT HAND: Status: RESOLVED | Noted: 2018-06-29 | Resolved: 2019-05-29

## 2019-05-29 PROBLEM — S62.619A PROXIMAL PHALANX FRACTURE OF FINGER: Status: RESOLVED | Noted: 2018-06-29 | Resolved: 2019-05-29

## 2019-05-29 NOTE — PROGRESS NOTES
Pt has not returned for therapy since 8/24/18.  Assume all goals are met to pt satisfaction.  D/C [facility].

## (undated) DEVICE — SOL NACL 0.9% IRRIG 1000ML BOTTLE 2F7124

## (undated) DEVICE — SUCTION MANIFOLD NEPTUNE 2 SYS 1 PORT 702-025-000

## (undated) DEVICE — PACK HAND CUSTOM ASC

## (undated) DEVICE — ESU HOLSTER PLASTIC DISP E2400

## (undated) DEVICE — LINEN ORTHO PACK 5446

## (undated) DEVICE — NDL 25GA 2"  8881200441

## (undated) DEVICE — PAD CHUX UNDERPAD 30X30"

## (undated) DEVICE — GLOVE PROTEXIS BLUE W/NEU-THERA 7.0  2D73EB70

## (undated) DEVICE — CAST PADDING 4" STERILE 9044S

## (undated) DEVICE — DRAPE STERI TOWEL LG 1010

## (undated) DEVICE — GLOVE PROTEXIS POWDER FREE SMT 6.5  2D72PT65X

## (undated) DEVICE — DRAPE C-ARM OEC MINI VIEW 6800   00-901917-01

## (undated) DEVICE — COVER CAMERA IN-LIGHT DISP LT-C02

## (undated) RX ORDER — DEXAMETHASONE SODIUM PHOSPHATE 4 MG/ML
INJECTION, SOLUTION INTRA-ARTICULAR; INTRALESIONAL; INTRAMUSCULAR; INTRAVENOUS; SOFT TISSUE
Status: DISPENSED
Start: 2018-06-06

## (undated) RX ORDER — ONDANSETRON 2 MG/ML
INJECTION INTRAMUSCULAR; INTRAVENOUS
Status: DISPENSED
Start: 2018-06-06

## (undated) RX ORDER — FENTANYL CITRATE 50 UG/ML
INJECTION, SOLUTION INTRAMUSCULAR; INTRAVENOUS
Status: DISPENSED
Start: 2018-06-06

## (undated) RX ORDER — LIDOCAINE HYDROCHLORIDE 20 MG/ML
INJECTION, SOLUTION EPIDURAL; INFILTRATION; INTRACAUDAL; PERINEURAL
Status: DISPENSED
Start: 2018-06-06

## (undated) RX ORDER — POLYMYXIN B SULFATE 500000 [IU]/1
INJECTION, POWDER, LYOPHILIZED, FOR SOLUTION INTRAMUSCULAR; INTRATHECAL; INTRAVENOUS; OPHTHALMIC
Status: DISPENSED
Start: 2018-06-06

## (undated) RX ORDER — BACITRACIN 50000 [IU]/1
INJECTION, POWDER, FOR SOLUTION INTRAMUSCULAR
Status: DISPENSED
Start: 2018-06-06

## (undated) RX ORDER — LIDOCAINE HYDROCHLORIDE 10 MG/ML
INJECTION, SOLUTION EPIDURAL; INFILTRATION; INTRACAUDAL; PERINEURAL
Status: DISPENSED
Start: 2018-06-06

## (undated) RX ORDER — GABAPENTIN 300 MG/1
CAPSULE ORAL
Status: DISPENSED
Start: 2018-06-06

## (undated) RX ORDER — BUPIVACAINE HYDROCHLORIDE 5 MG/ML
INJECTION, SOLUTION EPIDURAL; INTRACAUDAL
Status: DISPENSED
Start: 2018-06-06

## (undated) RX ORDER — ACETAMINOPHEN 325 MG/1
TABLET ORAL
Status: DISPENSED
Start: 2018-06-06

## (undated) RX ORDER — CEFAZOLIN SODIUM 1 G/3ML
INJECTION, POWDER, FOR SOLUTION INTRAMUSCULAR; INTRAVENOUS
Status: DISPENSED
Start: 2018-06-06

## (undated) RX ORDER — PROPOFOL 10 MG/ML
INJECTION, EMULSION INTRAVENOUS
Status: DISPENSED
Start: 2018-06-06